# Patient Record
Sex: MALE | Race: WHITE | NOT HISPANIC OR LATINO | Employment: FULL TIME | URBAN - METROPOLITAN AREA
[De-identification: names, ages, dates, MRNs, and addresses within clinical notes are randomized per-mention and may not be internally consistent; named-entity substitution may affect disease eponyms.]

---

## 2017-01-16 ENCOUNTER — OFFICE VISIT (OUTPATIENT)
Dept: FAMILY MEDICINE | Facility: OTHER | Age: 57
End: 2017-01-16
Payer: COMMERCIAL

## 2017-01-16 ENCOUNTER — TELEPHONE (OUTPATIENT)
Dept: FAMILY MEDICINE | Facility: OTHER | Age: 57
End: 2017-01-16

## 2017-01-16 VITALS
DIASTOLIC BLOOD PRESSURE: 104 MMHG | SYSTOLIC BLOOD PRESSURE: 170 MMHG | TEMPERATURE: 97.5 F | OXYGEN SATURATION: 100 % | HEIGHT: 63 IN | WEIGHT: 176 LBS | RESPIRATION RATE: 12 BRPM | HEART RATE: 66 BPM | BODY MASS INDEX: 31.18 KG/M2

## 2017-01-16 DIAGNOSIS — R03.0 ELEVATED BLOOD PRESSURE READING WITHOUT DIAGNOSIS OF HYPERTENSION: ICD-10-CM

## 2017-01-16 DIAGNOSIS — Z11.59 NEED FOR HEPATITIS C SCREENING TEST: ICD-10-CM

## 2017-01-16 DIAGNOSIS — R73.09 ELEVATED GLUCOSE: Primary | ICD-10-CM

## 2017-01-16 DIAGNOSIS — Z13.1 SCREENING FOR DIABETES MELLITUS: ICD-10-CM

## 2017-01-16 DIAGNOSIS — J01.00 ACUTE MAXILLARY SINUSITIS, RECURRENCE NOT SPECIFIED: Primary | ICD-10-CM

## 2017-01-16 DIAGNOSIS — Z13.6 CARDIOVASCULAR SCREENING; LDL GOAL LESS THAN 160: ICD-10-CM

## 2017-01-16 LAB
ANION GAP SERPL CALCULATED.3IONS-SCNC: 5 MMOL/L (ref 3–14)
BUN SERPL-MCNC: 21 MG/DL (ref 7–30)
CALCIUM SERPL-MCNC: 9.1 MG/DL (ref 8.5–10.1)
CHLORIDE SERPL-SCNC: 105 MMOL/L (ref 94–109)
CHOLEST SERPL-MCNC: 242 MG/DL
CO2 SERPL-SCNC: 33 MMOL/L (ref 20–32)
CREAT SERPL-MCNC: 1.05 MG/DL (ref 0.66–1.25)
GFR SERPL CREATININE-BSD FRML MDRD: 73 ML/MIN/1.7M2
GLUCOSE SERPL-MCNC: 128 MG/DL (ref 70–99)
HDLC SERPL-MCNC: 44 MG/DL
LDLC SERPL CALC-MCNC: 160 MG/DL
NONHDLC SERPL-MCNC: 198 MG/DL
POTASSIUM SERPL-SCNC: 5.4 MMOL/L (ref 3.4–5.3)
SODIUM SERPL-SCNC: 143 MMOL/L (ref 133–144)
TRIGL SERPL-MCNC: 188 MG/DL

## 2017-01-16 PROCEDURE — 80048 BASIC METABOLIC PNL TOTAL CA: CPT | Performed by: PHYSICIAN ASSISTANT

## 2017-01-16 PROCEDURE — 36415 COLL VENOUS BLD VENIPUNCTURE: CPT | Performed by: PHYSICIAN ASSISTANT

## 2017-01-16 PROCEDURE — 80061 LIPID PANEL: CPT | Performed by: PHYSICIAN ASSISTANT

## 2017-01-16 PROCEDURE — 86803 HEPATITIS C AB TEST: CPT | Performed by: PHYSICIAN ASSISTANT

## 2017-01-16 PROCEDURE — 99214 OFFICE O/P EST MOD 30 MIN: CPT | Performed by: PHYSICIAN ASSISTANT

## 2017-01-16 RX ORDER — CEFDINIR 300 MG/1
300 CAPSULE ORAL 2 TIMES DAILY
Qty: 20 CAPSULE | Refills: 0 | Status: SHIPPED | OUTPATIENT
Start: 2017-01-16 | End: 2017-02-08

## 2017-01-16 RX ORDER — FLUTICASONE PROPIONATE 50 MCG
1-2 SPRAY, SUSPENSION (ML) NASAL DAILY
Qty: 1 BOTTLE | Refills: 1 | Status: SHIPPED | OUTPATIENT
Start: 2017-01-16 | End: 2018-04-03

## 2017-01-16 ASSESSMENT — PAIN SCALES - GENERAL: PAINLEVEL: NO PAIN (0)

## 2017-01-16 NOTE — MR AVS SNAPSHOT
After Visit Summary   1/16/2017    Jorge Jeffers    MRN: [id number]           Patient Information     Date Of Birth          1960        Visit Information        Provider Department      1/16/2017 8:30 AM Carlos Joseph PA-C [facility]        Today's Diagnoses     Acute maxillary sinusitis, recurrence not specified    -  1     CARDIOVASCULAR SCREENING; LDL GOAL LESS THAN 160         Screening for diabetes mellitus         Need for hepatitis C screening test         Elevated blood pressure reading without diagnosis of hypertension           Care Instructions    Will place you on a 10 day course of Omnicef to take twice daily for 10 days to treat the sinus infection.  Take a probiotic or Activia yogurt daily while on this.  Drink plenty of fluids.  Can use an over the counter Nettipot or sinus rinse to help with nasal congestion. Mucinex can also be helpful.   I also recommend Flonase to help with nasal swelling instead of the Zicam.  Follow up if symptoms are not improving.    Follow up weekly for the next 2 weeks for blood pressure checks. If still high, we may need to discuss adding a medication.  Try to eat a low salt diet.  Work on exercising 3-5 days per week.    I will notify you of your lab results.    Follow up annually.           Follow-ups after your visit        Your next 10 appointments already scheduled     Feb 08, 2017  4:15 PM   New Visit with Denis Guerrero MD   [facility]    [address] [place] [id number] [phone]              Who to contact     If you have questions or need follow up information about today's clinic visit or your schedule please contact [facility] directly at [phone].  Normal or non-critical lab and imaging results will be communicated to you by MyChart, letter or phone within 4 business days after the clinic has received the results. If you  "do not hear from us within 7 days, please contact the clinic through [facility] or phone. If you have a critical or abnormal lab result, we will notify you by phone as soon as possible.  Submit refill requests through [facility] or call your pharmacy and they will forward the refill request to us. Please allow 3 business days for your refill to be completed.          Additional Information About Your Visit        [employer] Information     [facility] lets you send messages to your doctor, view your test results, renew your prescriptions, schedule appointments and more. To sign up, go to [email or web address] . Click on \"Log in\" on the left side of the screen, which will take you to the Welcome page. Then click on \"Sign up Now\" on the right side of the page.     You will be asked to enter the access code listed below, as well as some personal information. Please follow the directions to create your username and password.     Your access code is: [email or web address]  Expires: 2017  8:53 AM     Your access code will  in 90 days. If you need help or a new code, please call your [place] clinic or [phone].        Care EveryWhere ID     This is your Care EveryWhere ID. This could be used by other organizations to access your [place] medical records  [id number]        Your Vitals Were     Pulse Temperature Respirations Height BMI (Body Mass Index) Pulse Oximetry    66 97.5  F (36.4  C) (Temporal) 12 5' 3\" (1.6 m) 31.18 kg/m2 100%       Blood Pressure from Last 3 Encounters:   17 160/108   16 104/71   16 120/72    Weight from Last 3 Encounters:   17 176 lb (79.833 kg)   16 167 lb (75.751 kg)   16 168 lb 11.2 oz (76.522 kg)              We Performed the Following     Basic metabolic panel  (Ca, Cl, CO2, Creat, Gluc, K, Na, BUN)     Hepatitis C antibody     Lipid Profile with reflex to direct LDL          Today's Medication Changes          These changes are accurate as of: 17  8:53 " AM.  If you have any questions, ask your nurse or doctor.               Start taking these medicines.        Dose/Directions    cefdinir 300 MG capsule   Commonly known as:  OMNICEF   Used for:  Acute maxillary sinusitis, recurrence not specified   Started by:  Carlos Joseph PA-C        Dose:  300 mg   Take 1 capsule (300 mg) by mouth 2 times daily   Quantity:  20 capsule   Refills:  0       fluticasone 50 MCG/ACT spray   Commonly known as:  FLONASE   Used for:  Acute maxillary sinusitis, recurrence not specified   Started by:  Carlos Joseph PA-C        Dose:  1-2 spray   Spray 1-2 sprays into both nostrils daily   Quantity:  1 Bottle   Refills:  1            Where to get your medicines      These medications were sent to [facility] [address] [place] [address] [facility] AT [facility] [address] & [facility]  [address] [facility], [facility] [address]     Phone:  [phone]    - cefdinir 300 MG capsule  - fluticasone 50 MCG/ACT spray             Primary Care Provider Office Phone # Fax #    Tami Banuelos [phone]       [facility]  [address] [facility] [address]        Thank you!     Thank you for choosing [facility]  for your care. Our goal is always to provide you with excellent care. Hearing back from our patients is one way we can continue to improve our services. Please take a few minutes to complete the written survey that you may receive in the mail after your visit with us. Thank you!             Your Updated Medication List - Protect others around you: Learn how to safely use, store and throw away your medicines at www.disposemymeds.org.          This list is accurate as of: 1/16/17  8:53 AM.  Always use your most recent med list.                   Brand Name Dispense Instructions for use    cefdinir 300 MG capsule    OMNICEF    20 capsule    Take 1 capsule (300 mg) by mouth 2 times daily       fluticasone 50 MCG/ACT spray     FLONASE    1 Bottle    Spray 1-2 sprays into both nostrils daily       scopolamine 72 hr patch    TRANSDERM-SCOP (1.5 MG)    4 patch    Place 1 patch onto the skin every 72 hours Apply to hairless area behind one ear at least 4 hours before travel.

## 2017-01-16 NOTE — PATIENT INSTRUCTIONS
Will place you on a 10 day course of Omnicef to take twice daily for 10 days to treat the sinus infection.  Take a probiotic or Activia yogurt daily while on this.  Drink plenty of fluids.  Can use an over the counter Nettipot or sinus rinse to help with nasal congestion. Mucinex can also be helpful.   I also recommend Flonase to help with nasal swelling instead of the Zicam.  Follow up if symptoms are not improving.    Follow up weekly for the next 2 weeks for blood pressure checks. If still high, we may need to discuss adding a medication.  Try to eat a low salt diet.  Work on exercising 3-5 days per week.    I will notify you of your lab results.    Follow up annually.

## 2017-01-16 NOTE — NURSING NOTE
"Chief Complaint   Patient presents with     Sinus Problem       Initial /108 mmHg  Pulse 66  Temp(Src) 97.5  F (36.4  C) (Temporal)  Resp 12  Ht 5' 3\" (1.6 m)  Wt 176 lb (79.833 kg)  BMI 31.18 kg/m2  SpO2 100% Estimated body mass index is 31.18 kg/(m^2) as calculated from the following:    Height as of this encounter: 5' 3\" (1.6 m).    Weight as of this encounter: 176 lb (79.833 kg).  BP completed using cuff size: regular    Naomi Retana CMA      "

## 2017-01-16 NOTE — Clinical Note
[facility]  [address] [facility] [address]  [facility] [id number] [phone]             January 17, 2017    Jorge Downingpalma  [address] [facility] [address]              Dear Jorge,    Please call and notify patient that his cholesterol was elevated along with elevated glucose and potassium. I would like him to undergo another lab test called an A1c which looks at his glucose over the past 3 months to make sure he does not have diabetes. His potassium is an electrolytes in his body like sodium that can be elevated in patient's who have high blood glucose or those who eat a lot of foods that are high in potassium. I recommend he cut back on the potassium rich foods in his diet which include foods such as: nuts, bran, spinach, tomatoes, cauliflower, bananas, kiwi, and beef. He should also work on cutting back on the sugary, fatty foods and try to exercise 3-5 days per week. I would like him to follow up within the next month to further discuss his lab results as well as his high blood sugar. In the meantime, he should come in to get his A1c drawn. Let me know if he has questions.     Carlos Joseph PA-C     Results for orders placed or performed in visit on 01/16/17   Lipid Profile with reflex to direct LDL   Result Value Ref Range    Cholesterol 242 (H) <200 mg/dL    Triglycerides 188 (H) <150 mg/dL    HDL Cholesterol 44 >39 mg/dL    LDL Cholesterol Calculated 160 (H) <100 mg/dL    Non HDL Cholesterol 198 (H) <130 mg/dL   Basic metabolic panel  (Ca, Cl, CO2, Creat, Gluc, K, Na, BUN)   Result Value Ref Range    Sodium 143 133 - 144 mmol/L    Potassium 5.4 (H) 3.4 - 5.3 mmol/L    Chloride 105 94 - 109 mmol/L    Carbon Dioxide 33 (H) 20 - 32 mmol/L    Anion Gap 5 3 - 14 mmol/L    Glucose 128 (H) 70 - 99 mg/dL    Urea Nitrogen 21 7 - 30 mg/dL    Creatinine 1.05 0.66 - 1.25 mg/dL    GFR Estimate 73 >60 mL/min/1.7m2    GFR Estimate If Black 89 >60 mL/min/1.7m2    Calcium 9.1 8.5 - 10.1 mg/dL

## 2017-01-16 NOTE — TELEPHONE ENCOUNTER
----- Message from Carlos Joseph PA-C sent at 1/16/2017 12:33 PM CST -----  Please call and notify patient that his cholesterol was elevated along with elevated glucose and potassium. I would like him to undergo another lab test called an A1c which looks at his glucose over the past 3 months to make sure he does not have diabetes. His potassium is an electrolytes in his body like sodium that can be elevated in patient's who have high blood glucose or those who eat a lot of foods that are high in potassium. I recommend he cut back on the potassium rich foods in his diet which include foods such as: nuts, bran, spinach, tomatoes, cauliflower, bananas, kiwi, and beef. He should also work on cutting back on the sugary, fatty foods and try to exercise 3-5 days per week. I would like him to follow up within the next month to further discuss his lab results as well as his high blood sugar. In the meantime, he should come in to get his A1c drawn. Let me know if he has questions.    Carlos Joseph PA-C

## 2017-01-16 NOTE — PROGRESS NOTES
"  SUBJECTIVE:                                                    Jorge Jeffers is a 56 year old male who presents to clinic today for the following health issues:    HPI    Acute Illness   Acute illness concerns: sinus  Onset: 4 weeks    Fever: no    Chills/Sweats: no    Headache (location?): no    Sinus Pressure:YES- facial pain and teeth pain    Conjunctivitis:  no    Ear Pain: no    Rhinorrhea: no    Congestion: YES    Sore Throat: no     Cough: YES - \"very little\"    Wheeze: no    Decreased Appetite: no    Nausea: no    Vomiting: no    Diarrhea:  no    Dysuria/Freq.: no    Fatigue/Achiness: YES- fatigue    Sick/Strep Exposure: no     Therapies Tried and outcome: Taylor     Has been experiencing sinus pressure, pain and congestion for the past month. He has been using a lot of over the counter Zicam which provides short term relief of his congestion.     Problem list and histories reviewed & adjusted, as indicated.  Additional history: none    Problem list, Medication list, Allergies, and Medical/Social/Surgical histories reviewed in [facility] and updated as appropriate.    ROS:  GENERAL: Denies fever, fatigue, weakness, weight gain, or weight loss.  HEENT: Eyes-Denies pain, redness, loss of vision, double or blurred vision.     Ears/Nose- +Sinus, congestion, nasal drainage. Denies tinnitus, loss of hearing, epistaxis, decreased sense of smell. Denies loss of sense of taste, dry mouth, or sore throat.   CARDIOVASCULAR: Denies chest pain, shortness of breath, irregular heartbeats, palpitations, or edema.  RESPIRATORY: +Very slight cough. Denies hemoptysis and shortness of breath.  GASTROINTESTINAL: Denies nausea, vomiting, change in appetite, abdominal pain, diarrhea, or constipation.  GENITOURINARY: Denies increased frequency, urgency, dysuria, hematuria, or incontinence.   NEUROLOGIC: Denies headache, fainting, dizziness, memory loss, numbness, tingling, or seizures.    OBJECTIVE:                                    " "                /104 mmHg  Pulse 66  Temp(Src) 97.5  F (36.4  C) (Temporal)  Resp 12  Ht 5' 3\" (1.6 m)  Wt 176 lb (79.833 kg)  BMI 31.18 kg/m2  SpO2 100%  Body mass index is 31.18 kg/(m^2).  GENERAL: healthy, alert and no distress  EYES: Eyes grossly normal to inspection, PERRL and conjunctivae and sclerae normal  HENT: ear canals and TM's normal. Nasal mucosa is erythematous and edematous. Pharynx is clear.   NECK: no adenopathy, no asymmetry, masses, or scars and thyroid normal to palpation  RESP: lungs clear to auscultation - no rales, rhonchi or wheezes  CV: regular rate and rhythm, normal S1 S2, no S3 or S4, no murmur, click or rub, no peripheral edema   NEURO: Normal strength and tone, mentation intact and speech normal.       ASSESSMENT/PLAN:                                                        ICD-10-CM    1. Acute maxillary sinusitis, recurrence not specified J01.00 cefdinir (OMNICEF) 300 MG capsule     fluticasone (FLONASE) 50 MCG/ACT spray   2. Elevated blood pressure reading without diagnosis of hypertension R03.0    3. CARDIOVASCULAR SCREENING; LDL GOAL LESS THAN 160 Z13.6 Lipid Profile with reflex to direct LDL   4. Screening for diabetes mellitus Z13.1 Basic metabolic panel  (Ca, Cl, CO2, Creat, Gluc, K, Na, BUN)   5. Need for hepatitis C screening test Z11.59 Hepatitis C antibody         1. Symptoms consistent with sinusitis. Will place him on a 10 day course of Omnicef to take twice daily for 10 days.  Instructed to take a probiotic or Activia yogurt daily while on this.  Drink plenty of fluids.  Can use an over the counter Nettipot or sinus rinse to help with nasal congestion. Mucinex can also be helpful.   I also reocommend Flonase to help with nasal swelling instead of the Zicam.  Follow up if symptoms are not improving.    2. Patient's blood pressure is quite elevated today during both readings although he states he drink coffee before he came. He denies any history of " hypertension.  I would like him to follow up weekly for the next 2 weeks for blood pressure checks. If still high, we may need to discuss adding a medication.  I discussed the importance of a low salt diet along with exercising 3-5 days per week.  Work on exercising 3-5 days per week.    3-5. Patient due for fasting labs so will order today and notify him of the results. If all labs stable, follow up annually.    Greater than 50% of 25 minute visit spent on counseling and plan of care.      Carlos Joseph PA-C  [facility]

## 2017-01-17 DIAGNOSIS — R73.09 ELEVATED GLUCOSE: ICD-10-CM

## 2017-01-17 DIAGNOSIS — E78.5 HYPERLIPIDEMIA LDL GOAL <130: Primary | ICD-10-CM

## 2017-01-17 LAB
HBA1C MFR BLD: 5.7 % (ref 4.3–6)
HCV AB SERPL QL IA: NORMAL

## 2017-01-17 PROCEDURE — 36415 COLL VENOUS BLD VENIPUNCTURE: CPT | Performed by: PHYSICIAN ASSISTANT

## 2017-01-17 PROCEDURE — 83036 HEMOGLOBIN GLYCOSYLATED A1C: CPT | Performed by: PHYSICIAN ASSISTANT

## 2017-01-17 RX ORDER — ATORVASTATIN CALCIUM 10 MG/1
10 TABLET, FILM COATED ORAL DAILY
Qty: 90 TABLET | Refills: 1 | Status: SHIPPED | OUTPATIENT
Start: 2017-01-17 | End: 2018-04-03

## 2017-01-17 NOTE — TELEPHONE ENCOUNTER
LOREE for pt to return our call. See message below. Vashti Lara CMA ([facility])

## 2017-01-23 ENCOUNTER — ALLIED HEALTH/NURSE VISIT (OUTPATIENT)
Dept: FAMILY MEDICINE | Facility: OTHER | Age: 57
End: 2017-01-23
Payer: COMMERCIAL

## 2017-01-23 ENCOUNTER — OFFICE VISIT (OUTPATIENT)
Dept: FAMILY MEDICINE | Facility: CLINIC | Age: 57
End: 2017-01-23
Payer: COMMERCIAL

## 2017-01-23 VITALS
DIASTOLIC BLOOD PRESSURE: 90 MMHG | HEART RATE: 80 BPM | TEMPERATURE: 98.2 F | WEIGHT: 175 LBS | BODY MASS INDEX: 31.01 KG/M2 | HEIGHT: 63 IN | RESPIRATION RATE: 12 BRPM | SYSTOLIC BLOOD PRESSURE: 140 MMHG

## 2017-01-23 VITALS — DIASTOLIC BLOOD PRESSURE: 88 MMHG | HEART RATE: 60 BPM | SYSTOLIC BLOOD PRESSURE: 138 MMHG

## 2017-01-23 DIAGNOSIS — Z01.30 BP CHECK: Primary | ICD-10-CM

## 2017-01-23 DIAGNOSIS — R39.15 URINARY URGENCY: Primary | ICD-10-CM

## 2017-01-23 LAB
ALBUMIN UR-MCNC: NEGATIVE MG/DL
APPEARANCE UR: CLEAR
BILIRUB UR QL STRIP: NEGATIVE
COLOR UR AUTO: YELLOW
GLUCOSE UR STRIP-MCNC: NEGATIVE MG/DL
HGB UR QL STRIP: NEGATIVE
KETONES UR STRIP-MCNC: NEGATIVE MG/DL
LEUKOCYTE ESTERASE UR QL STRIP: NEGATIVE
NITRATE UR QL: POSITIVE
PH UR STRIP: 6.5 PH (ref 5–7)
RBC #/AREA URNS AUTO: NORMAL /HPF (ref 0–2)
SP GR UR STRIP: <=1.005 (ref 1–1.03)
URN SPEC COLLECT METH UR: ABNORMAL
UROBILINOGEN UR STRIP-ACNC: 0.2 EU/DL (ref 0.2–1)
WBC #/AREA URNS AUTO: NORMAL /HPF (ref 0–2)

## 2017-01-23 PROCEDURE — 87086 URINE CULTURE/COLONY COUNT: CPT | Performed by: FAMILY MEDICINE

## 2017-01-23 PROCEDURE — 81001 URINALYSIS AUTO W/SCOPE: CPT | Performed by: FAMILY MEDICINE

## 2017-01-23 PROCEDURE — 99207 ZZC NO CHARGE NURSE ONLY: CPT

## 2017-01-23 PROCEDURE — 99213 OFFICE O/P EST LOW 20 MIN: CPT | Performed by: FAMILY MEDICINE

## 2017-01-23 ASSESSMENT — PAIN SCALES - GENERAL: PAINLEVEL: NO PAIN (1)

## 2017-01-23 NOTE — MR AVS SNAPSHOT
After Visit Summary   1/23/2017    Jorge Jeffers    MRN: [id number]           Patient Information     Date Of Birth          1960        Visit Information        Provider Department      1/23/2017 10:20 AM Jay Bueno MD [facility]        Today's Diagnoses     Kidney pain    -  1       Care Instructions    These are new changes to your current plan of care based on today's visit:    Medications stopped    Medications to be started None at this time   Change dose of this medication    New treatments        Follow up appointments:    1.  FOLLOW UP WITH YOUR PRIMARY CARE PROVIDER: As needed    Jay Bueno MD              Follow-ups after your visit        Your next 10 appointments already scheduled     Jan 30, 2017  2:30 PM   Nurse Only with NL ANDREW TEAM B, [facility] ([facility])    [address] [address] [phone]            Feb 08, 2017  4:15 PM   New Visit with Denis Guerrero MD   [facility] ([facility]    [address] [address] [phone]            Feb 16, 2017  3:30 PM   Office Visit with Carlos Joseph PA-C   [facility] ([facility])    [address] [place] [id number] [phone]           Bring a current list of meds and any records pertaining to this visit.  For Physicals, please bring immunization records and any forms needing to be filled out.  Please arrive 10 minutes early to complete paperwork.              Who to contact     If you have questions or need follow up information about today's clinic visit or your schedule please contact [facility] directly at [phone].  Normal or non-critical lab and imaging results will be communicated to you by MyChart, letter or phone within 4 business days after the clinic has received the results. If you do not  "hear from us within 7 days, please contact the clinic through [facility] or phone. If you have a critical or abnormal lab result, we will notify you by phone as soon as possible.  Submit refill requests through [employer] or call your pharmacy and they will forward the refill request to us. Please allow 3 business days for your refill to be completed.          Additional Information About Your Visit        [facility] Information     [facility] lets you send messages to your doctor, view your test results, renew your prescriptions, schedule appointments and more. To sign up, go to [email or web address] . Click on \"Log in\" on the left side of the screen, which will take you to the Welcome page. Then click on \"Sign up Now\" on the right side of the page.     You will be asked to enter the access code listed below, as well as some personal information. Please follow the directions to create your username and password.     Your access code is: [email or web address]  Expires: 2017  8:53 AM     Your access code will  in 90 days. If you need help or a new code, please call your [place] clinic or [phone].        Care EveryWhere ID     This is your Care EveryWhere ID. This could be used by other organizations to access your [place] medical records  [id number]        Your Vitals Were     Pulse Temperature Respirations Height BMI (Body Mass Index)       80 98.2  F (36.8  C) (Temporal) 12 5' 2.5\" (1.588 m) 31.48 kg/m2        Blood Pressure from Last 3 Encounters:   17 140/90   17 170/104   16 104/71    Weight from Last 3 Encounters:   17 175 lb (79.379 kg)   17 176 lb (79.833 kg)   16 167 lb (75.751 kg)              We Performed the Following     UA reflex to Microscopic and Culture     Urine Culture Aerobic Bacterial     Urine Microscopic        Primary Care Provider Office Phone # Fax #    Tami Banuelos [phone]       [facility]  [address] [facility] " [facility] [address]        Thank you!     Thank you for choosing [facility]  for your care. Our goal is always to provide you with excellent care. Hearing back from our patients is one way we can continue to improve our services. Please take a few minutes to complete the written survey that you may receive in the mail after your visit with us. Thank you!             Your Updated Medication List - Protect others around you: Learn how to safely use, store and throw away your medicines at www.disposemymeds.org.          This list is accurate as of: 1/23/17 10:46 AM.  Always use your most recent med list.                   Brand Name Dispense Instructions for use    atorvastatin 10 MG tablet    LIPITOR    90 tablet    Take 1 tablet (10 mg) by mouth daily       cefdinir 300 MG capsule    OMNICEF    20 capsule    Take 1 capsule (300 mg) by mouth 2 times daily       fluticasone 50 MCG/ACT spray    FLONASE    1 Bottle    Spray 1-2 sprays into both nostrils daily       scopolamine 72 hr patch    TRANSDERM-SCOP (1.5 MG)    4 patch    Place 1 patch onto the skin every 72 hours Apply to hairless area behind one ear at least 4 hours before travel.

## 2017-01-23 NOTE — PROGRESS NOTES
Jorge Jeffers is a 56 year old male who comes in today for a Blood Pressure check because of ongoing monitoring .    *Document pulse and BP  *Use new set of vitals button for multiple readings.  *Use extended vitals for orthostatic    Vitals as recorded, a large cuff was used.    Patient is not taking medication     Patient is not monitoring Blood Pressure at home.    Current complaints: none    Disposition: follow-up as indicated by MD/AP

## 2017-01-23 NOTE — PATIENT INSTRUCTIONS
These are new changes to your current plan of care based on today's visit:    Medications stopped    Medications to be started None at this time   Change dose of this medication    New treatments        Follow up appointments:    1.  FOLLOW UP WITH YOUR PRIMARY CARE PROVIDER: As needed    Jay Bueno MD

## 2017-01-23 NOTE — NURSING NOTE
"Chief Complaint   Patient presents with     Kidney Problem     Panel Management     MyChart, Flu shot, Honoring choices       Initial /100 mmHg  Pulse 80  Temp(Src) 98.2  F (36.8  C) (Temporal)  Resp 12  Ht 5' 2.5\" (1.588 m)  Wt 175 lb (79.379 kg)  BMI 31.48 kg/m2 Estimated body mass index is 31.48 kg/(m^2) as calculated from the following:    Height as of this encounter: 5' 2.5\" (1.588 m).    Weight as of this encounter: 175 lb (79.379 kg).  BP completed using cuff size: nikolay Wilburn MA    "

## 2017-01-23 NOTE — PROGRESS NOTES
"  SUBJECTIVE:                                                    Jorge Jeffers is a 56 year old male who presents to clinic today for the following health issues:      Genitourinary - Male--    For approximately the last week, this patient has had a sense of urgency to urinate but without major frequency. He states that his urine flow is normal and he feels that he empties his bladder well. Nocturia ×1 which is in chronic and long-standing. Denies any dysuria.     The symptom seems to have begun at the time of a significant sinus infection. He is currently on Omnicef with improvement. He has not had the need for decongestants or antihistamines which may have caused side effects. Symptoms seem to have been possibly magnified with the use of Omnicef.    His use of caffeine is stable with 2 cups per day. No other caffeine intake.  Today took an over-the-counter dose of Pyridium which is affected his urinalysis and created suspected false positive \"Nitrates\"        Onset: 5 days      Description:   Dysuria (painful urination): no   Hematuria (blood in urine): no   Frequency: YES-\"it feels like I have to go all the time\"   Are you urinating at night : no   Hesitancy (delay in urine): no   Retention (unable to empty): no   Decrease in urinary flow: no   Incontinence: no     Progression of Symptoms:  improving    Accompanying Signs & Symptoms:  Fever: no   Back/Flank pain: no   Urethral discharge: no   Testicle lumps/masses/pain: no   Nausea and/or vomiting: no   Abdominal pain: no    History:   History of frequent UTI's: no   History of kidney stones: no   History of hernias: no   Personal or Family history of Prostate problems: no  Sexually active: YES    Precipitating factors:       Alleviating factors:           Therapies Tried and outcome: none; Outcome - none           Problem list and histories reviewed & adjusted, as indicated.  Additional history: as documented    Problem list, Medication list, Allergies, and " "Medical/Social/Surgical histories reviewed in [facility] and updated as appropriate.    ROS:  C: NEGATIVE for fever, chills, change in weight  E: NEGATIVE for vision changes or irritation  E/M: NEGATIVE for ear, mouth and throat problems except for the improving sinusitis  R: NEGATIVE for significant cough or SOB  CV: NEGATIVE for chest pain, palpitations or peripheral edema  CV: over the last few weeks, has noted some elevated blood pressure readings. Blood pressure apparently has been at 140/90 at work.  GI: NEGATIVE for nausea, abdominal pain, heartburn, or change in bowel habits   male : As noted above  M: NEGATIVE for significant arthralgias or myalgia  N: NEGATIVE for weakness, dizziness or paresthesias  E: NEGATIVE for temperature intolerance, skin/hair changes  H: NEGATIVE for bleeding problems  P: NEGATIVE for changes in mood or affect    OBJECTIVE:                                                    /90 mmHg  Pulse 80  Temp(Src) 98.2  F (36.8  C) (Temporal)  Resp 12  Ht 5' 2.5\" (1.588 m)  Wt 175 lb (79.379 kg)  BMI 31.48 kg/m2  Body mass index is 31.48 kg/(m^2).  GENERAL: healthy, alert and no distress  HENT: ear canals- normal; TMs- normal; Nose- normal; Mouth- no ulcers, no lesions  NECK: no tenderness, no adenopathy, no asymmetry, no masses, no stiffness; thyroid- normal to palpation  RESP: lungs clear to auscultation - no rales, no rhonchi, no wheezes  CV: regular rates and rhythm, normal S1 S2, no S3 or S4 and no murmur, no click or rub -  ABDOMEN: soft, no tenderness, no  hepatosplenomegaly, no masses, normal bowel sounds  MS: extremities- no gross deformities noted, no edema  NEURO: strength and tone- normal, sensory exam- grossly normal, mentation- intact, speech- normal, reflexes- symmetric  BACK: no CVA tenderness, no paralumbar tenderness  - male: testicles- normal, no atrophy, no masses;  no inguinal hernias  Rectal- male: prostate symmetric w/o nodularity, no masses palpated, prostate " 2-3+ and reports no tenderness with examination of the prostate. No nodules noted.  PSYCH: Alert and oriented times 3; speech- coherent , normal rate and volume; able to articulate logical thoughts, able to abstract reason, no tangential thoughts, no hallucinations or delusions, affect- normal    Diagnostic test results:  Diagnostic Test Results:  Results for orders placed or performed in visit on 01/23/17 (from the past 24 hour(s))   UA reflex to Microscopic and Culture   Result Value Ref Range    Color Urine Yellow     Appearance Urine Clear     Glucose Urine Negative NEG mg/dL    Bilirubin Urine Negative NEG    Ketones Urine Negative NEG mg/dL    Specific Gravity Urine <=1.005 1.003 - 1.035    Blood Urine Negative NEG    pH Urine 6.5 5.0 - 7.0 pH    Protein Albumin Urine Negative NEG mg/dL    Urobilinogen Urine 0.2 0.2 - 1.0 EU/dL    Nitrite Urine Positive (A) NEG    Leukocyte Esterase Urine Negative NEG    Source Midstream Urine    Urine Microscopic   Result Value Ref Range    WBC Urine O - 2 0 - 2 /HPF    RBC Urine O - 2 0 - 2 /HPF        Patient did take medications (generic Pyridium) a few hours prior to the urinalysis being obtained.  ASSESSMENT/PLAN:                                                      (R39.15) Urinary urgency  (primary encounter diagnosis)  Comment:  Minor symptoms at the present time, the acute onset associated chronologically with his respiratory infection. The symptoms are improving. No sign of prostatitis on clinical exam. Suspect the urinalysis is negative and a urine culture will be negative. The present time, have recommended monitoring the symptoms. Currently no medications were given.   Plan: UA reflex to Microscopic and Culture, Urine         Culture Aerobic Bacterial, Urine Microscopic            (I10) Elevated BP  Suggested low-salt diet. To continue monitoring his blood pressure is currently recommended on a weekly basis. He is being [facility] at [facility].   Plan:          Follow up with Provider - follow-up as recommended for his blood pressure.   See Patient Instructions    The patient understood the rational for the diagnosis and treatment plan. All questions were answered to best of my ability and the patient's satisfaction.      Jay Bueno MD  [facility]

## 2017-01-27 ENCOUNTER — TELEPHONE (OUTPATIENT)
Dept: FAMILY MEDICINE | Facility: CLINIC | Age: 57
End: 2017-01-27

## 2017-01-27 LAB
BACTERIA SPEC CULT: NO GROWTH
MICRO REPORT STATUS: NORMAL
SPECIMEN SOURCE: NORMAL

## 2017-01-27 NOTE — TELEPHONE ENCOUNTER
Left message asking patient to return call.  Please inform patient of RESULTS from Provider below.         Please call pt- his urine culture was negative for infection. Tootie Zuleta PA-C

## 2017-01-28 ENCOUNTER — TELEPHONE (OUTPATIENT)
Dept: NURSING | Facility: CLINIC | Age: 57
End: 2017-01-28

## 2017-01-28 NOTE — TELEPHONE ENCOUNTER
Call Type: Triage Call    Presenting Problem:     Patient returning clinic call from yesterday.  Patient was read the following   Call Documentation: 01/27/17 per  Tootie Zuleta PA-C per [facility]:  Please inform patient of RESULTS  from Provider below.         Please call pt- his urine culture was  negative for infection. Tootie Zuleta PA-C  Triage Note:  Guideline Title: Information Only Call; No Symptom Triage (Adult)  Recommended Disposition: Provide Information or Advice Only  Original Inclination:  Override Disposition:  Intended Action:  Physician Contacted: No  Follow-up call to recent contact; no triage required. Information provided from  past call documentation, approved references or experience. ?  YES  Requesting regular office appointment ? NO  Sign(s) or symptom(s) associated with a diagnosed condition or with a new illness  ? NO  Requesting information about provider, services or community resources ? NO  Call back to complete assessment/clarification of information from prior caller to  complete triage ? NO  Requesting information and provider is best resource; no triage required. ? NO  Caller not with patient and is unable to provide clinical information about  patient to facilitate triage. ? NO  Requesting provider information for recently scheduled test, procedure; no triage  required. Needed information not available per approved resources or clinical  experience. ? NO  Requesting information not available per approved reference or clinical  experience; no triage required. ? NO  Requesting information regarding scheduled exam, test or procedure; no triage  required. Information provided from approved resources or clinical experience. ?  NO  General information question; no triage required. Information provided from  approved references or knowledge of organization. ? NO  Health information question; person denies any symptoms, no triage required.  Information provided from approved  references or clinical experience. ? NO  Physician Instructions:  Care Advice:

## 2017-01-30 ENCOUNTER — ALLIED HEALTH/NURSE VISIT (OUTPATIENT)
Dept: FAMILY MEDICINE | Facility: OTHER | Age: 57
End: 2017-01-30
Payer: COMMERCIAL

## 2017-01-30 VITALS — SYSTOLIC BLOOD PRESSURE: 132 MMHG | HEART RATE: 60 BPM | DIASTOLIC BLOOD PRESSURE: 70 MMHG

## 2017-01-30 DIAGNOSIS — Z01.30 BP CHECK: Primary | ICD-10-CM

## 2017-01-30 PROCEDURE — 99207 ZZC NO CHARGE NURSE ONLY: CPT

## 2017-01-30 NOTE — NURSING NOTE
Jorge Jeffers is a 56 year old male who comes in today for a Blood Pressure check because of ongoing blood pressure monitoring.    *Document pulse and BP  *Use new set of vitals button for multiple readings.  *Use extended vitals for orthostatic    Vitals as recorded, a regular cuff was used.    Patient is not taking medication.  Patient is not monitoring Blood Pressure at home.  Average readings if yes are NA    Current complaints: none    Disposition: follow-up as indicated by MD/AP. Has an appointment to follow up with [facility].

## 2017-02-08 ENCOUNTER — OFFICE VISIT (OUTPATIENT)
Dept: OTOLARYNGOLOGY | Facility: OTHER | Age: 57
End: 2017-02-08
Payer: COMMERCIAL

## 2017-02-08 VITALS — WEIGHT: 173 LBS | OXYGEN SATURATION: 98 % | BODY MASS INDEX: 31.12 KG/M2 | HEART RATE: 60 BPM

## 2017-02-08 DIAGNOSIS — J32.9 CHRONIC SINUSITIS, UNSPECIFIED LOCATION: Primary | ICD-10-CM

## 2017-02-08 DIAGNOSIS — N32.81 OVERACTIVE BLADDER: ICD-10-CM

## 2017-02-08 PROCEDURE — 99203 OFFICE O/P NEW LOW 30 MIN: CPT | Mod: 25 | Performed by: OTOLARYNGOLOGY

## 2017-02-08 PROCEDURE — 31231 NASAL ENDOSCOPY DX: CPT | Performed by: OTOLARYNGOLOGY

## 2017-02-08 NOTE — NURSING NOTE
"Chief Complaint   Patient presents with     Consult     Sinus Problem       Initial Pulse 60  Wt 78.472 kg (173 lb)  SpO2 98% Estimated body mass index is 31.12 kg/(m^2) as calculated from the following:    Height as of 1/23/17: 1.588 m (5' 2.5\").    Weight as of this encounter: 78.472 kg (173 lb).  Medication Reconciliation: complete  "

## 2017-02-08 NOTE — PROGRESS NOTES
History of Present Illness - Jorge Jeffers is a 56 year old male who presents with concerns about sinus symptoms. The main symptom recently has been sevre sinus pressure and congestion , and this has been present since early winter . Other associated symptoms have included scant postnasal d/c without any smell or taste or sore throat. These symptoms have been constant and are very disruptive to the patient's lifestyle. Recent treatments have included Omnicef for 2 weeks and Flonase.  A 2 week trial of nasal steroids has been completed. A burst of oral steroids has not been completed. The patient has no history of sinus surgery. The patient reports has no history for migraine headaches.  This has been going on for the last few byrd. He denies allergies. Also wakes up with frontal am headaches.  His symptoms are pretty much under control now.    Past Medical History -   Patient Active Problem List   Diagnosis     Esophageal reflux     CARDIOVASCULAR SCREENING; LDL GOAL LESS THAN 160     Motion sickness, initial encounter     Hyperlipidemia LDL goal <130     Elevated BP       Current Medications -   Current outpatient prescriptions:      atorvastatin (LIPITOR) 10 MG tablet, Take 1 tablet (10 mg) by mouth daily, Disp: 90 tablet, Rfl: 1     fluticasone (FLONASE) 50 MCG/ACT spray, Spray 1-2 sprays into both nostrils daily, Disp: 1 Bottle, Rfl: 1     scopolamine (TRANSDERM-SCOP) (1.5mg base/3day) patch, Place 1 patch onto the skin every 72 hours Apply to hairless area behind one ear at least 4 hours before travel., Disp: 4 patch, Rfl: 11    Allergies -   Allergies   Allergen Reactions     No Known Drug Allergies        Social History -   Social History     Social History     Marital Status: Unknown     Spouse Name: N/A     Number of Children: N/A     Years of Education: N/A     Social History Main Topics     Smoking status: Former Smoker     Types: Cigars     Smokeless tobacco: Never Used      Comment:  occasioanlly     Alcohol Use: Yes      Comment: 1 day a week     Drug Use: No     Sexual Activity:     Partners: Female     Birth Control/ Protection: Surgical      Comment: tubal ligation     Other Topics Concern     Parent/Sibling W/ Cabg, Mi Or Angioplasty Before 65f 55m? No     Social History Narrative       Family History -   Family History   Problem Relation Age of Onset     HEART DISEASE Father      stent     Hypertension Father      Cardiovascular Father      stent     CEREBROVASCULAR DISEASE Paternal Uncle      HEART DISEASE Paternal Grandmother      Cardiovascular Paternal Grandmother      Asthma No family hx of      C.A.D. No family hx of      DIABETES No family hx of      Breast Cancer No family hx of      Cancer - colorectal No family hx of      Prostate Cancer No family hx of      Alzheimer Disease No family hx of      Arthritis No family hx of      Blood Disease No family hx of      CANCER No family hx of      Circulatory No family hx of      Eye Disorder No family hx of      GASTROINTESTINAL DISEASE No family hx of      Genitourinary Problems No family hx of      Lipids No family hx of      Musculoskeletal Disorder No family hx of      Neurologic Disorder No family hx of      Respiratory No family hx of      Thyroid Disease No family hx of      Other Cancer No family hx of      Depression No family hx of      MENTAL ILLNESS No family hx of      Substance Abuse No family hx of      Anesthesia Reaction No family hx of      Anxiety Disorder No family hx of      OSTEOPOROSIS No family hx of      Genetic Disorder No family hx of      Colon Cancer No family hx of      Hyperlipidemia No family hx of      Coronary Artery Disease No family hx of        Review of Systems - As per HPI and PMHx, otherwise system review of the head and neck negative.    Physical Exam  Vitals: Pulse 60  Wt 78.472 kg (173 lb)  SpO2 98%  BMI= Body mass index is 31.12 kg/(m^2).    General - The patient is well nourished and well  developed, and appears to have good nutritional status.  Alert and oriented to person and place, answers questions and cooperates with examination appropriately.   Head and Face - Normocephalic and atraumatic, with no gross asymmetry noted of the contour of the facial features.  The facial nerve is intact, with strong symmetric movements.  Voice and Breathing - The patient was breathing comfortably without the use of accessory muscles. There was no wheezing, stridor, or stertor.  The patients voice was clear and strong, and had appropriate pitch and quality.  Ears - Bilateral pinna and EACs with normal appearing overlying skin. Tympanic membrane intact with good mobility on pneumatic otoscopy bilaterally. Bony landmarks of the ossicular chain are normal. The tympanic membranes are normal in appearance. No retraction, perforation, or masses.  No fluid or purulence was seen in the external canal or the middle ear.   Eyes - Extraocular movements intact.  Sclera were not icteric or injected, conjunctiva were pink and moist.  Mouth - Examination of the oral cavity showed pink, healthy oral mucosa. No lesions or ulcerations noted.  The tongue was mobile and midline, and the dentition were in good condition.    Throat - The walls of the oropharynx were smooth, pink, moist, symmetric, and had no lesions or ulcerations.  The tonsillar pillars and soft palate were symmetric.  The uvula was midline on elevation.  Neck - Normal midline excursion of the laryngotracheal complex during swallowing.  Full range of motion on passive movement.  Palpation of the occipital, submental, submandibular, internal jugular chain, and supraclavicular nodes did not demonstrate any abnormal lymph nodes or masses.  The carotid pulse was palpable bilaterally.  Palpation of the thyroid was soft and smooth, with no nodules or goiter appreciated.  The trachea was mobile and midline.  Nose - External contour is symmetric, no gross deflection or scars.   Nasal mucosa is pink and moist with no abnormal mucus.  The septum was midline and non-obstructive, turbinates of normal size and position.  No polyps, masses, or purulence noted on examination.  Neuro - normal gait and muscle tone, nonfocal  To further evaluate the nasal cavity, I performed rigid nasal endoscopy.  I first sprayed the nasal cavity bilaterally with a mix of lidocaine and neosynephrine.  I then began on the left side using a 2.7mm, 30 degree rigid nasal endoscope.  The septum was fairly straight and the nasal airway was open.  No abnormal secretions, purulence, or polyps were noted. The left middle turbinate and middle meatus were clearly visualized and normal in appearance.  Looking up, the olfactory cleft was unobstructed.  Going further back, the sphenoethmoid recess was normal in appearance, with healthy appearing mucosa on the face of the sphenoid.  The nasopharynx was unremarkable, and the eustachian tube opening on this side was unobstructed.    I then turned my attention to the right side.  Once again, the septum was fairly straight, and the airway was open.  No abnormal secretions, purulence, polyps were noted.  The right middle turbinate and middle meatus were clearly visualized and normal in appearance.  Looking up, the olfactory cleft was unobstructed.  Going further back the right sphenoethmoid recess was normal in appearance, and eustachian tube opening was unobstructed.      ASSESSMENT/PLAN:  Jorge Jeffers is a 56 year old male with recurrent winter sinusitis .  WOuld require further investigation with CT to examine anatomy of sinuses as well as allergy eval to make sure that this is not cause of the problem.  He is complaining of urinary issues for which we shall refer him to Urology.        Return in 6 weeks    Denis Guerrero MD

## 2017-02-09 ENCOUNTER — OFFICE VISIT (OUTPATIENT)
Dept: FAMILY MEDICINE | Facility: OTHER | Age: 57
End: 2017-02-09
Payer: COMMERCIAL

## 2017-02-09 VITALS
WEIGHT: 174 LBS | HEART RATE: 70 BPM | OXYGEN SATURATION: 98 % | SYSTOLIC BLOOD PRESSURE: 134 MMHG | BODY MASS INDEX: 31.3 KG/M2 | RESPIRATION RATE: 12 BRPM | DIASTOLIC BLOOD PRESSURE: 86 MMHG | TEMPERATURE: 98.7 F

## 2017-02-09 DIAGNOSIS — R30.0 DYSURIA: ICD-10-CM

## 2017-02-09 DIAGNOSIS — Z12.5 SCREENING FOR PROSTATE CANCER: ICD-10-CM

## 2017-02-09 DIAGNOSIS — R35.0 URINARY FREQUENCY: ICD-10-CM

## 2017-02-09 DIAGNOSIS — N40.1 BENIGN NON-NODULAR PROSTATIC HYPERPLASIA WITH LOWER URINARY TRACT SYMPTOMS: Primary | ICD-10-CM

## 2017-02-09 LAB
ALBUMIN UR-MCNC: NEGATIVE MG/DL
APPEARANCE UR: CLEAR
BILIRUB UR QL STRIP: NEGATIVE
COLOR UR AUTO: YELLOW
GLUCOSE UR STRIP-MCNC: NEGATIVE MG/DL
HGB UR QL STRIP: NEGATIVE
KETONES UR STRIP-MCNC: NEGATIVE MG/DL
LEUKOCYTE ESTERASE UR QL STRIP: NEGATIVE
NITRATE UR QL: NEGATIVE
PH UR STRIP: 7.5 PH (ref 5–7)
SP GR UR STRIP: 1.01 (ref 1–1.03)
URN SPEC COLLECT METH UR: ABNORMAL
UROBILINOGEN UR STRIP-ACNC: 0.2 EU/DL (ref 0.2–1)

## 2017-02-09 PROCEDURE — 99214 OFFICE O/P EST MOD 30 MIN: CPT | Performed by: PHYSICIAN ASSISTANT

## 2017-02-09 PROCEDURE — 81003 URINALYSIS AUTO W/O SCOPE: CPT | Performed by: PHYSICIAN ASSISTANT

## 2017-02-09 PROCEDURE — 87086 URINE CULTURE/COLONY COUNT: CPT | Performed by: PHYSICIAN ASSISTANT

## 2017-02-09 PROCEDURE — G0103 PSA SCREENING: HCPCS | Performed by: PHYSICIAN ASSISTANT

## 2017-02-09 RX ORDER — TERAZOSIN 2 MG/1
2 CAPSULE ORAL AT BEDTIME
Qty: 30 CAPSULE | Refills: 1 | Status: SHIPPED | OUTPATIENT
Start: 2017-02-09 | End: 2017-03-05

## 2017-02-09 ASSESSMENT — PAIN SCALES - GENERAL: PAINLEVEL: NO PAIN (0)

## 2017-02-09 NOTE — PROGRESS NOTES
Quick Note:    Test results discussed with patient during office visit.     Tish Phipps PA-C  ______

## 2017-02-09 NOTE — NURSING NOTE
"Chief Complaint   Patient presents with     UTI       Initial /86 mmHg  Pulse 70  Temp(Src) 98.7  F (37.1  C) (Oral)  Resp 12  Ht   Wt 174 lb (78.926 kg)  SpO2 98% Estimated body mass index is 31.3 kg/(m^2) as calculated from the following:    Height as of 1/23/17: 5' 2.5\" (1.588 m).    Weight as of this encounter: 174 lb (78.926 kg).  Medication Reconciliation: complete  "

## 2017-02-09 NOTE — PATIENT INSTRUCTIONS
Benign Prostatic Hyperplasia  The prostate is a small gland that makes semen. As you age, the prostate grows. If it becomes too big, it may cause problems with urination. This condition is called benign prostatic hyperplasia (BPH).  Symptoms of BPH  BPH is common in men over age 60. That s because the prostate grows bigger during a man s life. As it grows, it presses against the urethra. The urethra carries urine out of your body from your bladder through your penis. Your bladder may also weaken as you age. It may not empty completely after you urinate.  Men with BPH may have these symptoms:    The urge to frequently urinate, especially at night    Leaking or dribbling of urine    A weak stream of urine    Not able to urinate, or having trouble starting to urinate  Diagnosing BPH  BPH can hurt your bladder and kidneys. It can also lead to bladder stones and urinary tract infections. If you think you may have BPH, talk with your healthcare provider. Early treatment can prevent problems.  Several tests can diagnose BPH. These include:    Digital rectal exam. During this procedure, your provider puts a gloved, greased (lubricated) finger into your rectum to check the size of your prostate.    Imaging tests. X-rays and other imaging tests can find problems in your kidneys or bladder.    Cystoscopy. This test uses a flexible tube with a camera (scope) to look inside your urinary tract.    Urine flow study. This test uses a special device to see how fast urine leaves your body.  Treating BPH  If you have mild symptoms, you may not need treatment. You may be able to control your BPH with lifestyle changes. Some men feel better if they limit or avoid alcohol and caffeinated drinks like coffee. Not drinking too many fluids at night can also help. Increasing your physical activity may ease symptoms, too.  Kegel exercises may also help. They strengthen the pelvic muscle to prevent urine from leaking. While urinating,  contract your pelvic muscle to stop or slow down the flow of urine. Hold for 10 seconds. Repeat at least 5 times. Do the exercise 3 to 5 times each day.  Certain medicines can worsen BPH symptoms. These include medicines for congestion, allergies, and depression. Medicines that increase your urine flow (diuretics) can also worsen BPH symptoms. If you take any of these, talk with your provider. You may need to take another medicine or change how much you take.  BPH symptoms often get worse as the prostate grows. So at some point you may need treatment. Your provider may prescribe medicine to shrink the prostate or stop its growth. Other treatments can make the urethra wider to let urine to flow more easily. There are also some minimally invasive techniques to remove prostate tissue.  If your BPH is severe, your health care provider may recommend surgery. Surgery takes out enlarged parts of the prostate gland. Your health care provider can figure out the best option for you based on your age, overall health, and other factors.  BPH and Prostate Cancer  BPH and prostate cancer share some symptoms. That s why it s important to talk with your provider about your symptoms. Men with BPH aren t more likely to develop this cancer. But they may have higher levels of the prostate-specific antigen (PSA). A higher PSA level may also be a sign of prostate cancer. Certain tests help distinguish BPH from prostate cancer. They include prostate ultrasound and biopsy.    8844-7870 [employer]. [address] [place] [address] [id number]. All rights reserved. This information is not intended as a substitute for professional medical care. Always follow your healthcare professional's instructions.        BPH (Enlarged Prostate)  The prostate is a gland at the base of the bladder. As some men get older, the prostate may get bigger in size. This problem is called benign prostatic hyperplasia (BPH). BPH puts pressure on the  urethra. This is the tube that carries urine from the bladder to the penis. It may interfere with the flow of urine. It may also keep the bladder from emptying fully.    Symptoms of BPH include trouble starting urination and feeling as though the bladder isn t emptying all the way. It also includes a weak urine stream, dribbling and leaking of urine, and frequent and urgent urination (especially at night). BPH can increase the risk of urinary infections. It can also block off urine flow completely. If this occurs, a thin tube (catheter) may be passed into the bladder to help drain urine.  If symptoms are mild, no treatment may be needed right now. If symptoms are more severe, treatment is likely needed. The goal of treatment is to improve urine flow and reduce symptoms. Treatments can include medications and procedures. Your doctor will discuss treatment options with you as needed.  Home care  The following guidelines will help you care for yourself at home:    Urinate as soon as you feel the urge. Don't try to hold your urine.    Don't limit your fluid intake during the day. Drink 6 to 8 glasses of water or liquids a day. This prevents bacteria from building up in the bladder.    Avoid drinking fluids after dinner to help reduce urination during the night.    Avoid medications that can worsen your symptoms. These include certain cold and allergy medications and antidepressants. Diuretics used for high blood pressure can also worsen symptoms. Talk to your doctor about the medications you take. Other drugs may work better for you.  Prostate cancer screening  BPH does not increase the risk of prostate cancer. But because prostate cancer is a common cancer in men, screening is sometimes recommended. This may help detect the cancer in its early stages when treatment is most effective. Factors that can increase the risk of prostate cancer include being -American or having a father or brother who had prostate  cancer. A high-fat diet may also increase the risk of prostate cancer. Talk to your doctor to see whether you should be screened for prostate cancer.  Follow-up care  Follow up with your doctor or urologist as told. To learn more, go to:    National Kidney & Urologic Diseases Information Clearinghouse  kidney.niddk.nih.gov, 581.529.1121  When to seek medical care  Get prompt medical attention if any of the following occur:    Fever of 100.4 F (38.0 C) or higher, or as directed by your health care provider    Unable to pass urine for 8 hours    Increasing pressure or pain in your bladder (lower abdomen)    Blood in the urine    Increasing low back pain, not related to injury    Symptoms of urinary infection (increased urge to urinate, burning when passing urine, foul-smelling urine)    3716-0844 [employer]. [address] [place] [address] [id number]. All rights reserved. This information is not intended as a substitute for professional medical care. Always follow your healthcare professional's instructions.

## 2017-02-09 NOTE — PROGRESS NOTES
"  SUBJECTIVE:                                                    Jorge Jeffers is a 56 year old male who presents to clinic today for the following health issues:    HPI   Genitourinary - Male     Onset: 3 weeks     Description:   Dysuria (painful urination): YES  Hematuria (blood in urine): no   Frequency: YES  Are you urinating at night : YES- sometimes once a night  Hesitancy (delay in urine): no   Retention (unable to empty): no   Decrease in urinary flow: no   Incontinence: no     Progression of Symptoms:  Today is the best day    Accompanying Signs & Symptoms:  Fever: no   Back/Flank pain: no   Urethral discharge: no   Testicle lumps/masses/pain: no   Nausea and/or vomiting: no   Abdominal pain: YES   History:   History of frequent UTI's: no   History of kidney stones: no   History of hernias: no   Personal or Family history of Prostate problems: no  Sexually active: YES    Precipitating factors:       Alleviating factors:      - Frequency all the time   - Had a rectal exam by another doctor and was told \"normal\"      Therapies Tried and outcome: AZO; Outcome - seems to help a little bit      Problem list and histories reviewed & adjusted, as indicated.  Additional history: as documented    Labs reviewed in EPIC  Problem list, Medication list, Allergies, and Medical/Social/Surgical histories reviewed in [facility] and updated as appropriate.    ROS:  Constitutional, HEENT, cardiovascular, pulmonary, gi and gu systems are negative, except as otherwise noted.    OBJECTIVE:                                                    /86 mmHg  Pulse 70  Temp(Src) 98.7  F (37.1  C) (Oral)  Resp 12  Ht   Wt 174 lb (78.926 kg)  SpO2 98%  Body mass index is 31.3 kg/(m^2).  GENERAL APPEARANCE: healthy, alert and no distress  EYES: Eyes grossly normal to inspection, PERRLA, conjunctivae and sclerae without injection or discharge, EOM intact   MS: No musculoskeletal defects are noted and gait is age appropriate without " ataxia   SKIN: No suspicious lesions or rashes, hydration status appears adeuqate with normal skin turgor   Rectal exam: Declined by patient.  PSYCH: Alert and oriented x3; speech- coherent , normal rate and volume; able to articulate logical thoughts, able to abstract reason, no tangential thoughts, no hallucinations or delusions, mentation appears normal, Mood is euthymic. Affect is appropriate for this mood state and bright. Thought content is free of suicidal ideation, hallucinations, and delusions. Dress is adequate and upkept. Eye contact is good during conversation.       Diagnostic Test Results:  Results for orders placed or performed in visit on 02/09/17 (from the past 24 hour(s))   *UA reflex to Microscopic and Culture ([facility] and [facility] (except [place] and [place])   Result Value Ref Range    Color Urine Yellow     Appearance Urine Clear     Glucose Urine Negative NEG mg/dL    Bilirubin Urine Negative NEG    Ketones Urine Negative NEG mg/dL    Specific Gravity Urine 1.015 1.003 - 1.035    Blood Urine Negative NEG    pH Urine 7.5 (H) 5.0 - 7.0 pH    Protein Albumin Urine Negative NEG mg/dL    Urobilinogen Urine 0.2 0.2 - 1.0 EU/dL    Nitrite Urine Negative NEG    Leukocyte Esterase Urine Negative NEG    Source Unspecified Urine    `     ASSESSMENT/PLAN:                                                        ICD-10-CM    1. Benign non-nodular prostatic hyperplasia with lower urinary tract symptoms N40.1 terazosin (HYTRIN) 2 MG capsule   2. Urinary frequency R35.0    3. Dysuria R30.0 *UA reflex to Microscopic and Culture ([facility] and [facility] (except [place] and [place])     Urine Culture Aerobic Bacterial   4. Screening for prostate cancer Z12.5 PSA, screen     - Patient with severe urinary frequency but two normal UAs, PSA was over a year ago and was normal   - Review of chart when patient states had prostate exam (declined today) did report 2-3+ hypertrophic  prostate   - Will get culture of urine as well as update PSA today  - Results will be communicated to patient when available and appropriate medication changes made if necessary   - Recommend we treat this as BPH and start medication to help with symptoms   - Start Terazosin at night, reviewed use and side effects  - Patient has recheck with PCP next Thursday, can recheck this medication at that time as well  - If symptoms persist, could consider dose increase or referral to urology     The patient indicates understanding of these issues and agrees with the plan.    Follow up: as above         Tish Phipps PA-C  [facility]

## 2017-02-09 NOTE — MR AVS SNAPSHOT
After Visit Summary   2/9/2017    Jorge Jeffers    MRN: [id number]           Patient Information     Date Of Birth          1960        Visit Information        Provider Department      2/9/2017 3:00 PM Tish Phipps PA-C [facility]        Today's Diagnoses     Benign non-nodular prostatic hyperplasia with lower urinary tract symptoms    -  1     Urinary frequency         Dysuria         Screening for prostate cancer           Care Instructions      Benign Prostatic Hyperplasia  The prostate is a small gland that makes semen. As you age, the prostate grows. If it becomes too big, it may cause problems with urination. This condition is called benign prostatic hyperplasia (BPH).  Symptoms of BPH  BPH is common in men over age 60. That s because the prostate grows bigger during a man s life. As it grows, it presses against the urethra. The urethra carries urine out of your body from your bladder through your penis. Your bladder may also weaken as you age. It may not empty completely after you urinate.  Men with BPH may have these symptoms:    The urge to frequently urinate, especially at night    Leaking or dribbling of urine    A weak stream of urine    Not able to urinate, or having trouble starting to urinate  Diagnosing BPH  BPH can hurt your bladder and kidneys. It can also lead to bladder stones and urinary tract infections. If you think you may have BPH, talk with your healthcare provider. Early treatment can prevent problems.  Several tests can diagnose BPH. These include:    Digital rectal exam. During this procedure, your provider puts a gloved, greased (lubricated) finger into your rectum to check the size of your prostate.    Imaging tests. X-rays and other imaging tests can find problems in your kidneys or bladder.    Cystoscopy. This test uses a flexible tube with a camera (scope) to look inside your urinary tract.    Urine flow study. This test uses  a special device to see how fast urine leaves your body.  Treating BPH  If you have mild symptoms, you may not need treatment. You may be able to control your BPH with lifestyle changes. Some men feel better if they limit or avoid alcohol and caffeinated drinks like coffee. Not drinking too many fluids at night can also help. Increasing your physical activity may ease symptoms, too.  Kegel exercises may also help. They strengthen the pelvic muscle to prevent urine from leaking. While urinating, contract your pelvic muscle to stop or slow down the flow of urine. Hold for 10 seconds. Repeat at least 5 times. Do the exercise 3 to 5 times each day.  Certain medicines can worsen BPH symptoms. These include medicines for congestion, allergies, and depression. Medicines that increase your urine flow (diuretics) can also worsen BPH symptoms. If you take any of these, talk with your provider. You may need to take another medicine or change how much you take.  BPH symptoms often get worse as the prostate grows. So at some point you may need treatment. Your provider may prescribe medicine to shrink the prostate or stop its growth. Other treatments can make the urethra wider to let urine to flow more easily. There are also some minimally invasive techniques to remove prostate tissue.  If your BPH is severe, your health care provider may recommend surgery. Surgery takes out enlarged parts of the prostate gland. Your health care provider can figure out the best option for you based on your age, overall health, and other factors.  BPH and Prostate Cancer  BPH and prostate cancer share some symptoms. That s why it s important to talk with your provider about your symptoms. Men with BPH aren t more likely to develop this cancer. But they may have higher levels of the prostate-specific antigen (PSA). A higher PSA level may also be a sign of prostate cancer. Certain tests help distinguish BPH from prostate cancer. They include  prostate ultrasound and biopsy.    9527-0563 [employer]. [address] [place] [address], [place] [address]. All rights reserved. This information is not intended as a substitute for professional medical care. Always follow your healthcare professional's instructions.        BPH (Enlarged Prostate)  The prostate is a gland at the base of the bladder. As some men get older, the prostate may get bigger in size. This problem is called benign prostatic hyperplasia (BPH). BPH puts pressure on the urethra. This is the tube that carries urine from the bladder to the penis. It may interfere with the flow of urine. It may also keep the bladder from emptying fully.    Symptoms of BPH include trouble starting urination and feeling as though the bladder isn t emptying all the way. It also includes a weak urine stream, dribbling and leaking of urine, and frequent and urgent urination (especially at night). BPH can increase the risk of urinary infections. It can also block off urine flow completely. If this occurs, a thin tube (catheter) may be passed into the bladder to help drain urine.  If symptoms are mild, no treatment may be needed right now. If symptoms are more severe, treatment is likely needed. The goal of treatment is to improve urine flow and reduce symptoms. Treatments can include medications and procedures. Your doctor will discuss treatment options with you as needed.  Home care  The following guidelines will help you care for yourself at home:    Urinate as soon as you feel the urge. Don't try to hold your urine.    Don't limit your fluid intake during the day. Drink 6 to 8 glasses of water or liquids a day. This prevents bacteria from building up in the bladder.    Avoid drinking fluids after dinner to help reduce urination during the night.    Avoid medications that can worsen your symptoms. These include certain cold and allergy medications and antidepressants. Diuretics used for high blood pressure can  also worsen symptoms. Talk to your doctor about the medications you take. Other drugs may work better for you.  Prostate cancer screening  BPH does not increase the risk of prostate cancer. But because prostate cancer is a common cancer in men, screening is sometimes recommended. This may help detect the cancer in its early stages when treatment is most effective. Factors that can increase the risk of prostate cancer include being -American or having a father or brother who had prostate cancer. A high-fat diet may also increase the risk of prostate cancer. Talk to your doctor to see whether you should be screened for prostate cancer.  Follow-up care  Follow up with your doctor or urologist as told. To learn more, go to:    National Kidney & Urologic Diseases Information Clearinghouse  kidney.niddk.nih.gov, 976.726.8081  When to seek medical care  Get prompt medical attention if any of the following occur:    Fever of 100.4 F (38.0 C) or higher, or as directed by your health care provider    Unable to pass urine for 8 hours    Increasing pressure or pain in your bladder (lower abdomen)    Blood in the urine    Increasing low back pain, not related to injury    Symptoms of urinary infection (increased urge to urinate, burning when passing urine, foul-smelling urine)    6201-3917 [employer]. [address] [place] [address], [place] [address]. All rights reserved. This information is not intended as a substitute for professional medical care. Always follow your healthcare professional's instructions.              Follow-ups after your visit        Your next 10 appointments already scheduled     Feb 16, 2017  3:30 PM   Office Visit with Carlos Joseph PA-C   [facility] ([facility])    [address] [place] [address]   [phone]           Bring a current list of meds and any records pertaining to this visit.  For Physicals, please bring immunization  "records and any forms needing to be filled out.  Please arrive 10 minutes early to complete paperwork.              Future tests that were ordered for you today     Open Future Orders        Priority Expected Expires Ordered    CT Maxillofacial w/o Contrast Routine  2018            Who to contact     If you have questions or need follow up information about today's clinic visit or your schedule please contact [facility] directly at [phone].  Normal or non-critical lab and imaging results will be communicated to you by Photozeenhart, letter or phone within 4 business days after the clinic has received the results. If you do not hear from us within 7 days, please contact the clinic through [employer] or phone. If you have a critical or abnormal lab result, we will notify you by phone as soon as possible.  Submit refill requests through [employer] or call your pharmacy and they will forward the refill request to us. Please allow 3 business days for your refill to be completed.          Additional Information About Your Visit        Cuipo Information     [employer] lets you send messages to your doctor, view your test results, renew your prescriptions, schedule appointments and more. To sign up, go to [email or web address] . Click on \"Log in\" on the left side of the screen, which will take you to the Welcome page. Then click on \"Sign up Now\" on the right side of the page.     You will be asked to enter the access code listed below, as well as some personal information. Please follow the directions to create your username and password.     Your access code is: [email or web address]  Expires: 2017  8:53 AM     Your access code will  in 90 days. If you need help or a new code, please call your [facility] or [phone].        Care EveryWhere ID     This is your Care EveryWhere ID. This could be used by other organizations to access your [facility] medical records  [id number]        Your " Vitals Were     Pulse Temperature Respirations Pulse Oximetry          70 98.7  F (37.1  C) (Oral) 12 98%         Blood Pressure from Last 3 Encounters:   02/09/17 134/86   01/30/17 132/70   01/23/17 138/88    Weight from Last 3 Encounters:   02/09/17 174 lb (78.926 kg)   02/08/17 173 lb (78.472 kg)   01/23/17 175 lb (79.379 kg)              We Performed the Following     *UA reflex to Microscopic and Culture ([facility] and [facility] (except [place] and [place])     PSA, screen     Urine Culture Aerobic Bacterial          Today's Medication Changes          These changes are accurate as of: 2/9/17  3:35 PM.  If you have any questions, ask your nurse or doctor.               Start taking these medicines.        Dose/Directions    terazosin 2 MG capsule   Commonly known as:  HYTRIN   Used for:  Benign non-nodular prostatic hyperplasia with lower urinary tract symptoms   Started by:  Tish Phipps PA-C        Dose:  2 mg   Take 1 capsule (2 mg) by mouth At Bedtime   Quantity:  30 capsule   Refills:  1            Where to get your medicines      These medications were sent to [facility] [address] [place] [address] [facility] AT [facility] of Replaced by [facility] [address]  [address] [facility] [address]     Phone:  [phone]    - terazosin 2 MG capsule             Primary Care Provider Office Phone # Fax #    Tami Comfort Banuelos -[phone]       [address] [address]        Thank you!     Thank you for choosing [facility]  for your care. Our goal is always to provide you with excellent care. Hearing back from our patients is one way we can continue to improve our services. Please take a few minutes to complete the written survey that you may receive in the mail after your visit with us. Thank you!             Your Updated Medication List - Protect others around you: Learn how to safely use, store and throw  away your medicines at www.disposemymeds.org.          This list is accurate as of: 2/9/17  3:35 PM.  Always use your most recent med list.                   Brand Name Dispense Instructions for use    atorvastatin 10 MG tablet    LIPITOR    90 tablet    Take 1 tablet (10 mg) by mouth daily       fluticasone 50 MCG/ACT spray    FLONASE    1 Bottle    Spray 1-2 sprays into both nostrils daily       scopolamine 72 hr patch    TRANSDERM-SCOP (1.5 MG)    4 patch    Place 1 patch onto the skin every 72 hours Apply to hairless area behind one ear at least 4 hours before travel.       terazosin 2 MG capsule    HYTRIN    30 capsule    Take 1 capsule (2 mg) by mouth At Bedtime

## 2017-02-10 ENCOUNTER — TELEPHONE (OUTPATIENT)
Dept: FAMILY MEDICINE | Facility: OTHER | Age: 57
End: 2017-02-10

## 2017-02-10 LAB — PSA SERPL-ACNC: 0.62 UG/L (ref 0–4)

## 2017-02-10 NOTE — TELEPHONE ENCOUNTER
LM for pt to return our call please give message from below. Vashti Lara CMA ([facility])

## 2017-02-10 NOTE — TELEPHONE ENCOUNTER
----- Message from Tish Phipps PA-C sent at 2/10/2017 12:17 PM CST -----  Please call patient with the following message    PSA was decreased from 1 year ago, in normal range. No concerns for prostate cancer.     Buddy Phipps PA-C

## 2017-02-10 NOTE — PROGRESS NOTES
Quick Note:    Please call patient with the following message    PSA was decreased from 1 year ago, in normal range. No concerns for prostate cancer.     Buddy Phipps PA-C    ______

## 2017-02-12 LAB
BACTERIA SPEC CULT: NO GROWTH
MICRO REPORT STATUS: NORMAL
SPECIMEN SOURCE: NORMAL

## 2017-02-16 ENCOUNTER — OFFICE VISIT (OUTPATIENT)
Dept: FAMILY MEDICINE | Facility: OTHER | Age: 57
End: 2017-02-16
Payer: COMMERCIAL

## 2017-02-16 VITALS
HEART RATE: 63 BPM | WEIGHT: 172 LBS | OXYGEN SATURATION: 98 % | HEIGHT: 63 IN | SYSTOLIC BLOOD PRESSURE: 128 MMHG | BODY MASS INDEX: 30.48 KG/M2 | DIASTOLIC BLOOD PRESSURE: 84 MMHG | TEMPERATURE: 97.9 F | RESPIRATION RATE: 16 BRPM

## 2017-02-16 DIAGNOSIS — R39.15 URINARY URGENCY: ICD-10-CM

## 2017-02-16 DIAGNOSIS — N40.1 BENIGN NON-NODULAR PROSTATIC HYPERPLASIA WITH LOWER URINARY TRACT SYMPTOMS: Primary | ICD-10-CM

## 2017-02-16 DIAGNOSIS — E87.5 HYPERKALEMIA: ICD-10-CM

## 2017-02-16 DIAGNOSIS — R03.0 ELEVATED BLOOD PRESSURE READING WITHOUT DIAGNOSIS OF HYPERTENSION: ICD-10-CM

## 2017-02-16 DIAGNOSIS — E78.5 HYPERLIPIDEMIA LDL GOAL <130: ICD-10-CM

## 2017-02-16 PROCEDURE — 84132 ASSAY OF SERUM POTASSIUM: CPT | Performed by: PHYSICIAN ASSISTANT

## 2017-02-16 PROCEDURE — 36415 COLL VENOUS BLD VENIPUNCTURE: CPT | Performed by: PHYSICIAN ASSISTANT

## 2017-02-16 PROCEDURE — 99214 OFFICE O/P EST MOD 30 MIN: CPT | Performed by: PHYSICIAN ASSISTANT

## 2017-02-16 RX ORDER — TERAZOSIN 10 MG/1
10 CAPSULE ORAL AT BEDTIME
Qty: 30 CAPSULE | Refills: 0 | Status: SHIPPED | OUTPATIENT
Start: 2017-02-16 | End: 2018-04-03

## 2017-02-16 ASSESSMENT — PAIN SCALES - GENERAL: PAINLEVEL: NO PAIN (0)

## 2017-02-16 NOTE — LETTER
[address]  [facility] [address]  [phone]      February 17, 2017      Jorge Jeffers  [id number] [place] [address]            Ketan,    Your potassium is back to normal. You can still eat bananas and nuts but just avoid excess consumption. Thanks.          Sincerely,        Carlos Joseph PA-C

## 2017-02-16 NOTE — NURSING NOTE
"Chief Complaint   Patient presents with     Hypertension     Panel Management     my chart, flu, honoring choices, care everywhere,        Initial /90 (BP Location: Right arm, Patient Position: Chair, Cuff Size: Adult Regular)  Pulse 63  Temp 97.9  F (36.6  C) (Temporal)  Resp 16  Ht 5' 2.5\" (1.588 m)  Wt 172 lb (78 kg)  SpO2 98%  BMI 30.96 kg/m2 Estimated body mass index is 30.96 kg/(m^2) as calculated from the following:    Height as of this encounter: 5' 2.5\" (1.588 m).    Weight as of this encounter: 172 lb (78 kg).  Medication Reconciliation: complete     Naomi Retana, SANDOR      "

## 2017-02-16 NOTE — MR AVS SNAPSHOT
After Visit Summary   2/16/2017    Jorge Jeffers    MRN: [id number]           Patient Information     Date Of Birth          1960        Visit Information        Provider Department      2/16/2017 3:30 PM Carlos Joseph PA-C [facility]        Today's Diagnoses     Benign non-nodular prostatic hyperplasia with lower urinary tract symptoms    -  1    Urinary urgency        Hyperkalemia        Elevated blood pressure reading without diagnosis of hypertension        Hyperlipidemia LDL goal <130          Care Instructions    I recommend you increase the Hytrin to 6 mg nightly for 5 days then 8 mg nightly for 5 days then 10 mg nightly.  If symptoms are not improving over the next 2-4 weeks, let me know and I will send you to urology.  Drink plenty of fluids.     Check blood pressure a couple days per week at work. If consistently above 140/90, let me know.  Work on a low salt diet and continue with exercise.    Follow up in 3 months with repeat cholesterol.            Follow-ups after your visit        Future tests that were ordered for you today     Open Future Orders        Priority Expected Expires Ordered    Lipid Profile with reflex to direct LDL Routine 5/16/2017 6/16/2017 2/16/2017            Who to contact     If you have questions or need follow up information about today's clinic visit or your schedule please contact [facility] directly at [phone].  Normal or non-critical lab and imaging results will be communicated to you by MyChart, letter or phone within 4 business days after the clinic has received the results. If you do not hear from us within 7 days, please contact the clinic through MyChart or phone. If you have a critical or abnormal lab result, we will notify you by phone as soon as possible.  Submit refill requests through CriticalBlue or call your pharmacy and they will forward the refill request to us. Please allow 3 business days for your  "refill to be completed.          Additional Information About Your Visit        [employer] Information     [employer] lets you send messages to your doctor, view your test results, renew your prescriptions, schedule appointments and more. To sign up, go to [email or web address] . Click on \"Log in\" on the left side of the screen, which will take you to the Welcome page. Then click on \"Sign up Now\" on the right side of the page.     You will be asked to enter the access code listed below, as well as some personal information. Please follow the directions to create your username and password.     Your access code is: [email or web address]  Expires: 2017  8:53 AM     Your access code will  in 90 days. If you need help or a new code, please call your [facility] clinic or [phone].        Care EveryWhere ID     This is your Care EveryWhere ID. This could be used by other organizations to access your [facility] medical records  [id number]        Your Vitals Were     Pulse Temperature Respirations Height Pulse Oximetry BMI (Body Mass Index)    63 97.9  F (36.6  C) (Temporal) 16 5' 2.5\" (1.588 m) 98% 30.96 kg/m2       Blood Pressure from Last 3 Encounters:   17 134/90   17 134/86   17 132/70    Weight from Last 3 Encounters:   17 172 lb (78 kg)   17 174 lb (78.9 kg)   17 173 lb (78.5 kg)              We Performed the Following     Potassium          Today's Medication Changes          These changes are accurate as of: 17  4:00 PM.  If you have any questions, ask your nurse or doctor.               These medicines have changed or have updated prescriptions.        Dose/Directions    * terazosin 2 MG capsule   Commonly known as:  HYTRIN   This may have changed:  Another medication with the same name was added. Make sure you understand how and when to take each.   Used for:  Benign non-nodular prostatic hyperplasia with lower urinary tract symptoms        Dose:  2 mg   Take 1 capsule (2 " mg) by mouth At Bedtime   Quantity:  30 capsule   Refills:  1       * terazosin 10 MG capsule   Commonly known as:  HYTRIN   This may have changed:  You were already taking a medication with the same name, and this prescription was added. Make sure you understand how and when to take each.   Used for:  Benign non-nodular prostatic hyperplasia with lower urinary tract symptoms        Dose:  10 mg   Take 1 capsule (10 mg) by mouth At Bedtime   Quantity:  30 capsule   Refills:  0       * Notice:  This list has 2 medication(s) that are the same as other medications prescribed for you. Read the directions carefully, and ask your doctor or other care provider to review them with you.         Where to get your medicines      These medications were sent to [facility] [address] [place] [address] [facility] AT [place] [address] & [facility]  [address] [facility] [address]     Phone:  [phone]     terazosin 10 MG capsule                Primary Care Provider Office Phone # Fax #    Tami Banuelos -[phone]       [address] [address]        Thank you!     Thank you for choosing [facility]  for your care. Our goal is always to provide you with excellent care. Hearing back from our patients is one way we can continue to improve our services. Please take a few minutes to complete the written survey that you may receive in the mail after your visit with us. Thank you!             Your Updated Medication List - Protect others around you: Learn how to safely use, store and throw away your medicines at www.disposemymeds.org.          This list is accurate as of: 2/16/17  4:00 PM.  Always use your most recent med list.                   Brand Name Dispense Instructions for use    atorvastatin 10 MG tablet    LIPITOR    90 tablet    Take 1 tablet (10 mg) by mouth daily       fluticasone 50 MCG/ACT spray    FLONASE    1 Bottle    Spray 1-2  sprays into both nostrils daily       scopolamine 72 hr patch    TRANSDERM-SCOP (1.5 MG)    4 patch    Place 1 patch onto the skin every 72 hours Apply to hairless area behind one ear at least 4 hours before travel.       * terazosin 2 MG capsule    HYTRIN    30 capsule    Take 1 capsule (2 mg) by mouth At Bedtime       * terazosin 10 MG capsule    HYTRIN    30 capsule    Take 1 capsule (10 mg) by mouth At Bedtime       * Notice:  This list has 2 medication(s) that are the same as other medications prescribed for you. Read the directions carefully, and ask your doctor or other care provider to review them with you.

## 2017-02-16 NOTE — PATIENT INSTRUCTIONS
I recommend you increase the Hytrin to 6 mg nightly for 5 days then 8 mg nightly for 5 days then 10 mg nightly.  If symptoms are not improving over the next 2-4 weeks, let me know and I will send you to urology.  Drink plenty of fluids.     Check blood pressure a couple days per week at work. If consistently above 140/90, let me know.  Work on a low salt diet and continue with exercise.    Follow up in 3 months with repeat cholesterol.

## 2017-02-17 LAB — POTASSIUM SERPL-SCNC: 3.9 MMOL/L (ref 3.4–5.3)

## 2017-03-05 DIAGNOSIS — N40.1 BENIGN NON-NODULAR PROSTATIC HYPERPLASIA WITH LOWER URINARY TRACT SYMPTOMS: ICD-10-CM

## 2017-03-05 DIAGNOSIS — T75.3XXA MOTION SICKNESS, INITIAL ENCOUNTER: ICD-10-CM

## 2017-03-06 NOTE — TELEPHONE ENCOUNTER
Terazosin 2 mg      Last Written Prescription Date: 02/09/2017  Last Fill Quantity: 30,  # refills: 1   Last Office Visit with [facility], [facility] or [facility] prescribing provider: 02/16/2017

## 2017-03-07 NOTE — TELEPHONE ENCOUNTER
Patient should be on the 10 mg dose by now. Please clarify this with him - request is for 2 mg tablets. The 10 mg was sent on 2/16/17.    Tootie Beverly, RN, BSN

## 2017-03-08 RX ORDER — SCOLOPAMINE TRANSDERMAL SYSTEM 1 MG/1
1 PATCH, EXTENDED RELEASE TRANSDERMAL
Qty: 4 PATCH | Refills: 11 | Status: CANCELLED | OUTPATIENT
Start: 2017-03-08

## 2017-03-08 RX ORDER — TERAZOSIN 2 MG/1
CAPSULE ORAL
Qty: 90 CAPSULE | Refills: 0 | Status: SHIPPED | OUTPATIENT
Start: 2017-03-08 | End: 2018-04-03

## 2017-03-08 NOTE — TELEPHONE ENCOUNTER
Patient informed. He declined evisit but did schedule phone visit for tomorrow.  Naomi Retana, CMA

## 2017-03-09 ENCOUNTER — VIRTUAL VISIT (OUTPATIENT)
Dept: FAMILY MEDICINE | Facility: OTHER | Age: 57
End: 2017-03-09
Payer: COMMERCIAL

## 2017-03-09 DIAGNOSIS — T75.3XXA MOTION SICKNESS, INITIAL ENCOUNTER: Primary | ICD-10-CM

## 2017-03-09 PROCEDURE — 99441 ZZC PHYSICIAN TELEPHONE EVALUATION 5-10 MIN: CPT | Performed by: FAMILY MEDICINE

## 2017-03-09 RX ORDER — SCOLOPAMINE TRANSDERMAL SYSTEM 1 MG/1
PATCH, EXTENDED RELEASE TRANSDERMAL
Qty: 4 PATCH | Refills: 0 | Status: SHIPPED | OUTPATIENT
Start: 2017-03-09 | End: 2018-04-03

## 2017-03-09 NOTE — MR AVS SNAPSHOT
After Visit Summary   3/9/2017    Jorge Jeffers    MRN: [id number]           Patient Information     Date Of Birth          1960        Visit Information        Provider Department      3/9/2017 12:15 PM Tami Banuelos MD [facility]        Today's Diagnoses     Motion sickness, initial encounter    -  1       Follow-ups after your visit        Who to contact     If you have questions or need follow up information about today's clinic visit or your schedule please contact [facility] directly at [phone].  Normal or non-critical lab and imaging results will be communicated to you by [employer], letter or phone within 4 business days after the clinic has received the results. If you do not hear from us within 7 days, please contact the clinic through [employer] or phone. If you have a critical or abnormal lab result, we will notify you by phone as soon as possible.  Submit refill requests through [employer] or call your pharmacy and they will forward the refill request to us. Please allow 3 business days for your refill to be completed.          Additional Information About Your Visit        MyChart Information     [employer] gives you secure access to your electronic health record. If you see a primary care provider, you can also send messages to your care team and make appointments. If you have questions, please call your primary care clinic.  If you do not have a primary care provider, please call [phone] and they will assist you.        Care EveryWhere ID     This is your Care EveryWhere ID. This could be used by other organizations to access your [place] medical records  [id number]         Blood Pressure from Last 3 Encounters:   02/16/17 128/84   02/09/17 134/86   01/30/17 132/70    Weight from Last 3 Encounters:   02/16/17 172 lb (78 kg)   02/09/17 174 lb (78.9 kg)   02/08/17 173 lb (78.5 kg)              Today, you had the following     No orders found  for display         Today's Medication Changes          These changes are accurate as of: 3/9/17  1:00 PM.  If you have any questions, ask your nurse or doctor.               These medicines have changed or have updated prescriptions.        Dose/Directions    * scopolamine 72 hr patch   Commonly known as:  TRANSDERM-SCOP (1.5 MG)   This may have changed:  Another medication with the same name was added. Make sure you understand how and when to take each.   Used for:  Motion sickness, initial encounter   Changed by:  Samantha Portillo APRN CNP        Dose:  1 patch   Place 1 patch onto the skin every 72 hours Apply to hairless area behind one ear at least 4 hours before travel.   Quantity:  4 patch   Refills:  11       * scopolamine 72 hr patch   Commonly known as:  TRANSDERM   This may have changed:  You were already taking a medication with the same name, and this prescription was added. Make sure you understand how and when to take each.   Used for:  Motion sickness, initial encounter   Changed by:  Tami Banuelos MD        Apply 1 patch to hairless area behind one ear at least 4 hours before travel.  Remove old patch and change every 3 days (72 hours).   Quantity:  4 patch   Refills:  0       * Notice:  This list has 2 medication(s) that are the same as other medications prescribed for you. Read the directions carefully, and ask your doctor or other care provider to review them with you.         Where to get your medicines      These medications were sent to [facility] [address] [place] [address] [facility] AT [facility] [address]  [address] [facility], [facility] [address]     Phone:  [phone]     scopolamine 72 hr patch                Primary Care Provider Office Phone # Fax #    Tami Banuelos [phone]       [facility]  [address] [place] [address]        Thank you!     Thank you for choosing [facility]  for your care. Our  goal is always to provide you with excellent care. Hearing back from our patients is one way we can continue to improve our services. Please take a few minutes to complete the written survey that you may receive in the mail after your visit with us. Thank you!             Your Updated Medication List - Protect others around you: Learn how to safely use, store and throw away your medicines at www.disposemymeds.org.          This list is accurate as of: 3/9/17  1:00 PM.  Always use your most recent med list.                   Brand Name Dispense Instructions for use    atorvastatin 10 MG tablet    LIPITOR    90 tablet    Take 1 tablet (10 mg) by mouth daily       fluticasone 50 MCG/ACT spray    FLONASE    1 Bottle    Spray 1-2 sprays into both nostrils daily       * scopolamine 72 hr patch    TRANSDERM-SCOP (1.5 MG)    4 patch    Place 1 patch onto the skin every 72 hours Apply to hairless area behind one ear at least 4 hours before travel.       * scopolamine 72 hr patch    TRANSDERM    4 patch    Apply 1 patch to hairless area behind one ear at least 4 hours before travel.  Remove old patch and change every 3 days (72 hours).       * terazosin 10 MG capsule    HYTRIN    30 capsule    Take 1 capsule (10 mg) by mouth At Bedtime       * terazosin 2 MG capsule    HYTRIN    90 capsule    TAKE 1 CAPSULE(2 MG) BY MOUTH AT BEDTIME       * Notice:  This list has 4 medication(s) that are the same as other medications prescribed for you. Read the directions carefully, and ask your doctor or other care provider to review them with you.

## 2017-03-09 NOTE — PROGRESS NOTES
"Jorge Jeffers is a 56 year old male who is being evaluated via a telephone visit.      The patient has been notified of following:     \"This telephone visit will be conducted via a call between you and your physician/provider. We have found that certain health care needs can be provided without the need for a physical exam.  This service lets us provide the care you need with a short phone conversation.  If a prescription is necessary we can send it directly to your pharmacy.  If lab work is needed we can place an order for that and you can then stop by our lab to have the test done at a later time.    We will bill your insurance company for this service.  Please check with your medical insurance if this type of visit is covered. You may be responsible for the cost of this type of visit if insurance coverage is denied.  The typical cost is $30 (10min), $59 (11-20min) and $85 (21-30min).  Most often these visits are shorter than 10 minutes.    If during the course of the call the physician/provider feels a telephone visit is not appropriate, you will not be charged for this service.\"       Consent has been obtained for this service by 2 care team members: yes. See the scanned image in the medical record.    Jorge Jeffers complains of  Medication Request (motion sickness patch)      I have reviewed and updated the patient's Past Medical History, Social History, Family History and Medication List.    ALLERGIES  No known drug allergies    Naomi Retana CMA  (MA signature)    Additional provider notes: motion patch -- has had multiple times in his life.  Gets it with boats or cars.  Sometimes he can get this with just turning his head too quick.  Has used meclizine in past and worked, but gets him real tired.  Patch not so much.    BPPV suggestive symptoms on description.  Had evaluation with ENT at  in past with MRI and such.  Was unclear diagnosis, so was referred to neurology and chose not to do " this.  Mother, and son both have this as well.    Traveling soon.  Needs enough for 2 weeks, so 4 patches to cover him. Will consider further evaluation when he returns.  Will talk to his wife for which ENT and sign off for records as well.    Assessment/Plan:  1. Motion sickness, initial encounter          I have reviewed the note as documented above.  This accurately captures the substance of my conversation with the patient,  Tami Banuelos MD     Total time of call between patient and provider was 9 minutes ;

## 2018-03-06 ENCOUNTER — TRANSFERRED RECORDS (OUTPATIENT)
Dept: HEALTH INFORMATION MANAGEMENT | Facility: CLINIC | Age: 58
End: 2018-03-06

## 2018-03-12 ENCOUNTER — TELEPHONE (OUTPATIENT)
Dept: FAMILY MEDICINE | Facility: OTHER | Age: 58
End: 2018-03-12

## 2018-03-12 NOTE — TELEPHONE ENCOUNTER
Has seen JM last and can leave him first opportunity, I would refer to Dr. Yung otherwise/.  Tami Banuelos MD

## 2018-03-12 NOTE — TELEPHONE ENCOUNTER
Reason for Call: Request for an order or referral:    Order or referral being requested: hormone replacement therapy for low testosterone     Date needed: as soon as possible    Has the patient been seen by the PCP for this problem? NO    Additional comments: is wondering if he could be referred to someone with in [place].     Phone number Patient can be reached at:  Other phone number:  Catie simms [phone]    Best Time:  any    Can we leave a detailed message on this number?  YES    Call taken on 3/12/2018 at 3:19 PM by Radha Miller

## 2018-03-13 NOTE — TELEPHONE ENCOUNTER
He may not need a referral. See if he can just get set up with Dr. Yung but he should bring in his testosterone labs result from the clinic he went to.    Carlos Joseph PA-C

## 2018-03-13 NOTE — TELEPHONE ENCOUNTER
Patient said he had low testosterone when he was seen at [facility].   Does he even need a referral?

## 2018-03-13 NOTE — TELEPHONE ENCOUNTER
Left message for patient to return call, please refer to JM message and assist in scheduling OV if appropriate

## 2018-03-13 NOTE — TELEPHONE ENCOUNTER
Does he have a history of low testosterone? If not, he should have a visit to discuss this and get labs and then he can be referred appropriately.    Carlos Joseph PA-C

## 2018-04-03 ENCOUNTER — OFFICE VISIT (OUTPATIENT)
Dept: UROLOGY | Facility: CLINIC | Age: 58
End: 2018-04-03
Attending: OTOLARYNGOLOGY
Payer: COMMERCIAL

## 2018-04-03 VITALS — SYSTOLIC BLOOD PRESSURE: 182 MMHG | HEART RATE: 68 BPM | OXYGEN SATURATION: 99 % | DIASTOLIC BLOOD PRESSURE: 88 MMHG

## 2018-04-03 DIAGNOSIS — Z53.9 ERRONEOUS ENCOUNTER--DISREGARD: Primary | ICD-10-CM

## 2018-04-03 NOTE — MR AVS SNAPSHOT
After Visit Summary   4/3/2018    Jorge Jeffers    MRN: [id number]           Patient Information     Date Of Birth          1960        Visit Information        Provider Department      4/3/2018 2:30 PM Mauri Yung MD [facility]        Today's Diagnoses     ERRONEOUS ENCOUNTER--DISREGARD    -  1       Follow-ups after your visit        Your next 10 appointments already scheduled     Apr 06, 2018 12:00 PM CDT   Return [facility] [address] [phone]              Who to contact     If you have questions or need follow up information about today's clinic visit or your schedule please contact [facility] directly at [phone].  Normal or non-critical lab and imaging results will be communicated to you by MyChart, letter or phone within 4 business days after the clinic has received the results. If you do not hear from us within 7 days, please contact the clinic through MyChart or phone. If you have a critical or abnormal lab result, we will notify you by phone as soon as possible.  Submit refill requests through [employer] or call your pharmacy and they will forward the refill request to us. Please allow 3 business days for your refill to be completed.          Additional Information About Your Visit        MyChart Information     [employer] gives you secure access to your electronic health record. If you see a primary care provider, you can also send messages to your care team and make appointments. If you have questions, please call your primary care clinic.  If you do not have a primary care provider, please call [phone] and they will assist you.        Care EveryWhere ID     This is your Care EveryWhere ID. This could be used by other organizations to access your [place] medical records  [id number]        Your Vitals Were     Pulse Pulse  Oximetry                68 99%           Blood Pressure from Last 3 Encounters:   04/03/18 182/88   02/16/17 128/84   02/09/17 134/86    Weight from Last 3 Encounters:   02/16/17 172 lb (78 kg)   02/09/17 174 lb (78.9 kg)   02/08/17 173 lb (78.5 kg)              Today, you had the following     No orders found for display         Today's Medication Changes          These changes are accurate as of 4/3/18 11:59 PM.  If you have any questions, ask your nurse or doctor.               Stop taking these medicines if you haven't already. Please contact your care team if you have questions.     atorvastatin 10 MG tablet   Commonly known as:  LIPITOR           fluticasone 50 MCG/ACT spray   Commonly known as:  FLONASE           scopolamine 72 hr patch   Commonly known as:  TRANSDERM           terazosin 10 MG capsule   Commonly known as:  HYTRIN           terazosin 2 MG capsule   Commonly known as:  HYTRIN                    Primary Care Provider Office Phone # Fax #    Tami Comfort Banuelos [phone]       [address] [place] [address]        Equal Access to Services     [facility]: [facility] had[facility]o Soomaali, waaxda luqadaha, qaybta kaalmada adeegcourtney, martha horan . So [facility] [phone].    ATENCIÓN: Si habla español, tiene a barrientos disposición servicios gratuitos de asistencia lingüística. [facility] [phone].    We comply with applicable federal civil rights laws and [place] laws. We do not discriminate on the basis of race, color, national origin, age, disability, sex, sexual orientation, or gender identity.            Thank you!     Thank you for choosing [facility]  for your care. Our goal is always to provide you with excellent care. Hearing back from our patients is one way we can continue to improve our services. Please take a few minutes to complete the written survey that you may receive in the mail after your visit with us. Thank you!              Your Updated Medication List - Protect others around you: Learn how to safely use, store and throw away your medicines at www.disposemymeds.org.          This list is accurate as of 4/3/18 11:59 PM.  Always use your most recent med list.                   Brand Name Dispense Instructions for use Diagnosis    OMEPRAZOLE PO

## 2018-04-06 ENCOUNTER — OFFICE VISIT (OUTPATIENT)
Dept: UROLOGY | Facility: CLINIC | Age: 58
End: 2018-04-06
Payer: COMMERCIAL

## 2018-04-06 VITALS — DIASTOLIC BLOOD PRESSURE: 85 MMHG | SYSTOLIC BLOOD PRESSURE: 176 MMHG | HEART RATE: 62 BPM | OXYGEN SATURATION: 98 %

## 2018-04-06 DIAGNOSIS — R68.82 LOW LIBIDO: Primary | ICD-10-CM

## 2018-04-06 DIAGNOSIS — R03.0 ELEVATED BLOOD PRESSURE READING WITHOUT DIAGNOSIS OF HYPERTENSION: ICD-10-CM

## 2018-04-06 PROCEDURE — 99243 OFF/OP CNSLTJ NEW/EST LOW 30: CPT | Performed by: UROLOGY

## 2018-04-06 NOTE — PROGRESS NOTES
Visit Date:   2018      SUBJECTIVE: The patient is a pleasant 57-year-old  male who was requested to be seen by Dr. Tami Banuelos for a consultation with regard to patient's low testosterone.  The patient was seen at [facility] recently and had a testosterone done later in the afternoon that showed a value of 194.  He was seen by them initially because of low libido and decreased energy level.  He exercises about 5 times a week and has no problems with erections or urination issue.  He is healthy otherwise, without any significant medical problems.      ASSESSMENT:  Pleasant 57-year-old gentleman with low libido with recent low testosterone and recent blood tests.      RECOMMENDATIONS:  We will repeat his testosterone along with FSH LH and prolactin in the morning.  Treatment options for testosterone placement therapy discussed at length.  The risks of prostate cancer, polycythemia increased risk of cardiovascular events discussed at length.  Once we know the result of his testosterone, we will contact him to see whether he wants any testosterone replacement therapy knowing the above risks.  Noted his blood pressure is slightly elevated.  He will follow up with his primary care physician for that also.         ALYSE OBONE MD             D: 2018   T: 2018   MT: CLAY      Name:     NITESH VALLADARES   MRN:      [id number]        Account:      [id number]   :      1960           Visit Date:   2018      Document: [id number]

## 2018-04-06 NOTE — PROGRESS NOTES
S: See dictated note   Current Outpatient Prescriptions   Medication Sig Dispense Refill     OMEPRAZOLE PO        Allergies   Allergen Reactions     No Known Drug Allergies      No past medical history on file.  Past Surgical History:   Procedure Laterality Date     COLONOSCOPY N/A 3/28/2016    Procedure: COLONOSCOPY;  Surgeon: Tammi Mccoy MD;  Location: [facility] OR     COLONOSCOPY N/A 3/28/2016    Procedure: COMBINED COLONOSCOPY, SINGLE OR MULTIPLE BIOPSY/POLYPECTOMY BY BIOPSY;  Surgeon: Tammi Mccoy MD;  Location: [facility] OR     COLONOSCOPY WITH CO2 INSUFFLATION N/A 3/28/2016    Procedure: COLONOSCOPY WITH CO2 INSUFFLATION;  Surgeon: Tammi Mccoy MD;  Location: [facility] OR      Family History   Problem Relation Age of Onset     HEART DISEASE Father      stent     Hypertension Father      Cardiovascular Father      stent     HEART DISEASE Paternal Grandmother      Cardiovascular Paternal Grandmother      CEREBROVASCULAR DISEASE Paternal Uncle      Asthma No family hx of      C.A.D. No family hx of      DIABETES No family hx of      Breast Cancer No family hx of      Cancer - colorectal No family hx of      Prostate Cancer No family hx of      Alzheimer Disease No family hx of      Arthritis No family hx of      Blood Disease No family hx of      CANCER No family hx of      Circulatory No family hx of      Eye Disorder No family hx of      GASTROINTESTINAL DISEASE No family hx of      Genitourinary Problems No family hx of      Lipids No family hx of      Musculoskeletal Disorder No family hx of      Neurologic Disorder No family hx of      Respiratory No family hx of      Thyroid Disease No family hx of      Other Cancer No family hx of      Depression No family hx of      MENTAL ILLNESS No family hx of      Substance Abuse No family hx of      Anesthesia Reaction No family hx of      Anxiety Disorder No family hx of      OSTEOPOROSIS No family hx of      Genetic Disorder No family hx of      Colon Cancer No  family hx of      Hyperlipidemia No family hx of      Coronary Artery Disease No family hx of      Social History     Social History     Marital status: Unknown     Spouse name: N/A     Number of children: N/A     Years of education: N/A     Social History Main Topics     Smoking status: Former Smoker     Types: Cigars     Smokeless tobacco: Never Used      Comment: occasioanlly     Alcohol use Yes      Comment: 1 day a week     Drug use: No     Sexual activity: Yes     Partners: Female     Birth control/ protection: Surgical      Comment: tubal ligation     Other Topics Concern     Parent/Sibling W/ Cabg, Mi Or Angioplasty Before 65f 55m? No     Social History Narrative       REVIEW OF SYSTEMS  =================  C: NEGATIVE for fever, chills, change in weight  I: NEGATIVE for worrisome rashes, moles or lesions  E/M: NEGATIVE for ear, mouth and throat problems  R: NEGATIVE for significant cough or SHORTNESS OF BREATH  CV:  NEGATIVE for chest pain, palpitations or peripheral edema  GI: NEGATIVE for nausea, abdominal pain, heartburn, or change in bowel habits  NEURO: NEGATIVE numbness/weakness  : see HPI  PSYCH: NEGATIVE depression/anxiety  LYmph: no new enlarged lymph nodes  Ortho: no new trauma/movements      Physical Exam:  /85 (BP Location: Right arm, Patient Position: Chair, Cuff Size: Adult Regular)  Pulse 62  SpO2 98%   Patient is pleasant, in no acute distress, good general condition.  HEENT:  Normalcephalic, atraumatic  Lung: no evidence of respiratory distress    Abdomen: Soft, nondistended, non tender. No masses. No rebound or guarding.   Exam: penis no d/c. Testis no masses.  No scrotal skin lesion.  Prostate 30 gm smooth.  Skin: Warm and dry.  No redness.  Neuro: grossly normal  Psych normal mood and affect  Musculoskeletal  moving all extremities    Assessment/Plan:   See dictated note     Elevated bp: Patient to follow up with Primary Care provider regarding elevated blood pressure.

## 2018-04-06 NOTE — MR AVS SNAPSHOT
After Visit Summary   4/6/2018    Jorge Jeffers    MRN: [id number]           Patient Information     Date Of Birth          1960        Visit Information        Provider Department      4/6/2018 11:45 AM Mauri Yung MD [facility]        Today's Diagnoses     Low libido    -  1    Elevated blood pressure reading without diagnosis of hypertension           Follow-ups after your visit        Future tests that were ordered for you today     Open Future Orders        Priority Expected Expires Ordered    Testosterone, total Routine  4/6/2019 4/6/2018    Follicle stimulating hormone Routine  4/6/2019 4/6/2018    Lutropin Routine  4/6/2019 4/6/2018    Prolactin Routine  4/6/2019 4/6/2018            Who to contact     If you have questions or need follow up information about today's clinic visit or your schedule please contact [facility] directly at [phone].  Normal or non-critical lab and imaging results will be communicated to you by MyChart, letter or phone within 4 business days after the clinic has received the results. If you do not hear from us within 7 days, please contact the clinic through [employer] or phone. If you have a critical or abnormal lab result, we will notify you by phone as soon as possible.  Submit refill requests through [employer] or call your pharmacy and they will forward the refill request to us. Please allow 3 business days for your refill to be completed.          Additional Information About Your Visit        MyChart Information     [employer] gives you secure access to your electronic health record. If you see a primary care provider, you can also send messages to your care team and make appointments. If you have questions, please call your primary care clinic.  If you do not have a primary care provider, please call [phone] and they will assist you.        Care EveryWhere ID     This is your Care EveryWhere ID. This could be used by  other organizations to access your [place] medical records  [id number]        Your Vitals Were     Pulse Pulse Oximetry                62 98%           Blood Pressure from Last 3 Encounters:   04/06/18 176/85   04/03/18 182/88   02/16/17 128/84    Weight from Last 3 Encounters:   02/16/17 172 lb (78 kg)   02/09/17 174 lb (78.9 kg)   02/08/17 173 lb (78.5 kg)               Primary Care Provider Office Phone # Fax #    Tami Comfort Banuelos [phone]       [address] [place] [address]        Equal Access to Services     [facility]: Hadii aad stephen hadasho Soelviraali, waaxda luqadaha, qaybta kaalmada adejamesyada, martha horan . So [facility] [phone].    ATENCIÓN: Si habla español, tiene a barrientos disposición servicios gratuitos de asistencia lingüística. Llame al [phone].    We comply with applicable federal civil rights laws and [place] laws. We do not discriminate on the basis of race, color, national origin, age, disability, sex, sexual orientation, or gender identity.            Thank you!     Thank you for choosing [facility]  for your care. Our goal is always to provide you with excellent care. Hearing back from our patients is one way we can continue to improve our services. Please take a few minutes to complete the written survey that you may receive in the mail after your visit with us. Thank you!             Your Updated Medication List - Protect others around you: Learn how to safely use, store and throw away your medicines at www.disposemymeds.org.          This list is accurate as of 4/6/18 12:17 PM.  Always use your most recent med list.                   Brand Name Dispense Instructions for use Diagnosis    OMEPRAZOLE PO

## 2018-04-09 NOTE — PATIENT INSTRUCTIONS
Preventive Health Recommendations  Male Ages 50   64    Yearly exam:             See your health care provider every year in order to  o   Review health changes.   o   Discuss preventive care.    o   Review your medicines if your doctor has prescribed any.     Have a cholesterol test every 5 years, or more frequently if you are at risk for high cholesterol/heart disease.     Have a diabetes test (fasting glucose) every three years. If you are at risk for diabetes, you should have this test more often.     Have a colonoscopy at age 50, or have a yearly FIT test (stool test). These exams will check for colon cancer.      Talk with your health care provider about whether or not a prostate cancer screening test (PSA) is right for you.    You should be tested each year for STDs (sexually transmitted diseases), if you re at risk.     Shots: Get a flu shot each year. Get a tetanus shot every 10 years.     Nutrition:    Eat at least 5 servings of fruits and vegetables daily.     Eat whole-grain bread, whole-wheat pasta and brown rice instead of white grains and rice.     Talk to your provider about Calcium and Vitamin D.     Lifestyle    Exercise for at least 150 minutes a week (30 minutes a day, 5 days a week). This will help you control your weight and prevent disease.     Limit alcohol to one drink per day.     No smoking.     Wear sunscreen to prevent skin cancer.     See your dentist every six months for an exam and cleaning.     See your eye doctor every 1 to 2 years.    Will start you on a medication called hydrochlorithiazide to take daily in the mornings to help with your blood pressure.  Follow up weekly with the nurses for BP checks for the next month.  Try to cut back on the salt in your diet and continue with routine exercise.  Consider buying a home arm cuff to check your blood pressure. We would like you consistently below 140/90.    I recommend you continue with meclizine as needed for the motion sickness,  dizziness. I also recommend the modified Epley maneuver at home 2-3 times daily a few days before going on a fishing trip or traveling in a car for a long period of time.  If not improving, let me know.    Continue with omeprazole a few times weekly as needed for heartburn. Try to avoid greasy, fatty, spicy foods and cut back on the alcohol and coffee.    Follow up in 1 month with updated labs.    Follow up annually as long as blood pressure is improving.

## 2018-04-09 NOTE — PROGRESS NOTES
SUBJECTIVE:   CC: Jorge Jeffers is an 57 year old male who presents for preventative health visit.     Physical   Annual:     Getting at least 3 servings of Calcium per day::  Yes    Bi-annual eye exam::  Yes    Dental care twice a year::  Yes    Sleep apnea or symptoms of sleep apnea::  None    Diet::  Regular (no restrictions)    Frequency of exercise::  4-5 days/week    Duration of exercise::  Greater than 60 minutes    Taking medications regularly::  Yes    Medication side effects::  None    Additional concerns today::  YES (Patient would like to discuss motion sickness. He previously was told about patches that help. He said things like fishing, sitting in the back of a vehicle, and turning around fast cause it.)          He has been seen by ENT for his motion sickness/dizziness in the past and had a normal brain MRI last year. He was referred to neurology but never followed up. Symptoms only occur if on a long car rid, when fishing, or if turning his head to back up a vehicle. He will notice slight dizziness but not a spinning sensation as well as nausea. He takes meclizine as needed which helps. He has also tried scopolamine patches which also help but they are quite expensive.      He was experiencing some increased urinary frequency and burning last year and was diagnosed with BPH but he states one day, he thinks he may have passed a stone and symptoms have been resolved since then with no urinary frequency, decreased stream, or nocturia.      Today's PHQ-2 Score:   PHQ-2 ( 1999 [employer]) 4/13/2018   Q1: Little interest or pleasure in doing things 0   Q2: Feeling down, depressed or hopeless 0   PHQ-2 Score 0   Q1: Little interest or pleasure in doing things Not at all   Q2: Feeling down, depressed or hopeless Not at all   PHQ-2 Score 0     Answers for HPI/ROS submitted by the patient on 4/13/2018   PHQ-2 Score: 0    Abuse: Current or Past(Physical, Sexual or Emotional)- No  Do you feel safe in your  "environment - Yes    Social History   Substance Use Topics     Smoking status: Former Smoker     Types: Cigars     Smokeless tobacco: Never Used      Comment: occasioanlly     Alcohol use Yes      Comment: 1 day a week     Alcohol Use 4/13/2018   If you drink alcohol do you typically have greater than 3 drinks per day OR greater than 7 drinks per week? No   No flowsheet data found.    Last PSA:   PSA   Date Value Ref Range Status   02/09/2017 0.62 0 - 4 ug/L Final     Comment:     Assay Method:  Chemiluminescence using Siemens Vista analyzer       Reviewed orders with patient. Reviewed health maintenance and updated orders accordingly - Yes    Reviewed and updated as needed this visit by clinical staff  Tobacco  Allergies  Meds  Problems  Med Hx  Surg Hx  Fam Hx  Soc Hx          Reviewed and updated as needed this visit by Provider  Allergies  Meds  Problems         Review of Systems   Constitutional: Negative for chills and fever.   HENT: Negative for congestion, ear pain, hearing loss and sore throat.    Eyes: Negative for pain and visual disturbance.   Respiratory: Negative for cough and shortness of breath.    Cardiovascular: Negative for chest pain, palpitations and peripheral edema.   Gastrointestinal: Positive for heartburn. Negative for abdominal pain, constipation, diarrhea, hematochezia and nausea.   Genitourinary: Positive for impotence. Negative for discharge, dysuria, frequency, genital sores, hematuria and urgency.   Musculoskeletal: Positive for myalgias. Negative for arthralgias and joint swelling.   Skin: Negative for rash.   Neurological: Negative for dizziness, weakness, headaches and paresthesias.   Psychiatric/Behavioral: Negative for mood changes. The patient is not nervous/anxious.        OBJECTIVE:   /90  Pulse 54  Temp 97.7  F (36.5  C) (Temporal)  Resp 16  Ht 5' 3\" (1.6 m)  Wt 172 lb (78 kg)  SpO2 98%  BMI 30.47 kg/m2    Physical Exam  GENERAL: healthy, alert and no " distress  EYES: Eyes grossly normal to inspection, PERRL and conjunctivae and sclerae normal. No nystagmus.   HENT: ear canals and TM's normal, nose and mouth without ulcers or lesions  NECK: no adenopathy, no asymmetry, masses, or scars and thyroid normal to palpation  RESP: lungs clear to auscultation - no rales, rhonchi or wheezes  CV: regular rate and rhythm, normal S1 S2, no S3 or S4, no murmur, click or rub, no peripheral edema and peripheral pulses strong  ABDOMEN: soft, nontender, no hepatosplenomegaly, no masses and bowel sounds normal   (male): normal male circumcised genitalia without lesions or urethral discharge, no hernia  MS: no gross musculoskeletal defects noted, no edema. FROM to all extremities. No spinal tenderness.   SKIN: no suspicious lesions or rashes  NEURO: Normal strength and tone, mentation intact and speech normal. Cranial nerves II-XII are grossly intact. DTRs are 2+/4 throughout and symmetric. Gait is stable.   PSYCH: mentation appears normal, affect normal/bright    ASSESSMENT/PLAN:       ICD-10-CM    1. Encounter for routine adult health examination without abnormal findings Z00.00    2. Benign essential hypertension I10 hydrochlorothiazide (HYDRODIURIL) 25 MG tablet     Basic metabolic panel  (Ca, Cl, CO2, Creat, Gluc, K, Na, BUN)     CANCELED: TSH with free T4 reflex   3. Hyperlipidemia LDL goal <130 E78.5    4. Gastroesophageal reflux disease, esophagitis presence not specified K21.9    5. Benign prostatic hyperplasia without lower urinary tract symptoms N40.0    6. Motion sickness, subsequent encounter T75.3XXD    7. Benign paroxysmal positional vertigo, unspecified laterality H81.10    8. Motion sickness, initial encounter T75.3XXA    9. Screening for diabetes mellitus Z13.1 CANCELED: Basic metabolic panel  (Ca, Cl, CO2, Creat, Gluc, K, Na, BUN)         2. Persistently elevated blood pressure so he meets the criteria for hypertension. He has a history of mild hyperkalemia in  the past so will try to avoid lisinopril as first line treatment and will try hydrochlorithiazide instead, 25 mg. I discussed common side effects and will plan to repeat BMP in 1 month. Will order TSH today. He denies any symptoms of sleep apnea such as persistent snoring or daytime drowsiness. I encouraged he continue with a low salt diet and routine exercise. He will follow up with the nurses weekly for the next month for routine blood pressure checks. I also recommend he get a new home cuff and monitor closely. If consistently greater than 140/90, he will let me know.     BP Readings from Last 6 Encounters:   04/13/18 140/90   04/06/18 176/85   04/03/18 182/88   02/16/17 128/84   02/09/17 134/86   01/30/17 132/70     3. He stopped Lipitor 6 months ago as he wanted to see how he would do without it. If cholesterol still elevated, will restart this.    4. Stable, he only uses omeprazole a few times per week. I discussed potential long-term side effects with this medication but he states the H2 blockers have not been helpful.    5. No further symptoms since possibly passing a kidney stone last year.     7-8. Continued intermittent motion sickness/dizziness in certain situation and with specific head movements. Symptoms are consistent with BPPV. There was no nystagmus on exam today but in his ENT note from last year, he had upward beating nystagmus. He will continue with meclizine as needed and I also recommend the modified Epley maneuver a few days before triggering events. Will not refill scopolamine at this time as it is too expensive.     9. BMP ordered.    Follow up annually.     COUNSELING:   Reviewed preventive health counseling, as reflected in patient instructions       Regular exercise       Healthy diet/nutrition    BP Screening:   Last 3 BP Readings:    BP Readings from Last 3 Encounters:   04/13/18 140/90   04/06/18 176/85   04/03/18 182/88        reports that he has quit smoking. His smoking use  "included Cigars. He has never used smokeless tobacco.    Estimated body mass index is 30.47 kg/(m^2) as calculated from the following:    Height as of this encounter: 5' 3\" (1.6 m).    Weight as of this encounter: 172 lb (78 kg).   Weight management plan: Discussed healthy diet and exercise guidelines and patient will follow up in 12 months in clinic to re-evaluate.    Counseling Resources:  ATP IV Guidelines  Pooled Cohorts Equation Calculator  FRAX Risk Assessment  ICSI Preventive Guidelines  Dietary Guidelines for Americans, 2010  USDA's MyPlate  ASA Prophylaxis  Lung CA Screening    Carlos Joseph PA-C  [facility]  "

## 2018-04-13 ENCOUNTER — OFFICE VISIT (OUTPATIENT)
Dept: FAMILY MEDICINE | Facility: OTHER | Age: 58
End: 2018-04-13
Payer: COMMERCIAL

## 2018-04-13 VITALS
DIASTOLIC BLOOD PRESSURE: 90 MMHG | HEIGHT: 63 IN | OXYGEN SATURATION: 98 % | WEIGHT: 172 LBS | RESPIRATION RATE: 16 BRPM | HEART RATE: 54 BPM | SYSTOLIC BLOOD PRESSURE: 140 MMHG | TEMPERATURE: 97.7 F | BODY MASS INDEX: 30.48 KG/M2

## 2018-04-13 DIAGNOSIS — H81.10 BENIGN PAROXYSMAL POSITIONAL VERTIGO, UNSPECIFIED LATERALITY: ICD-10-CM

## 2018-04-13 DIAGNOSIS — Z00.00 ENCOUNTER FOR ROUTINE ADULT HEALTH EXAMINATION WITHOUT ABNORMAL FINDINGS: Primary | ICD-10-CM

## 2018-04-13 DIAGNOSIS — I10 BENIGN ESSENTIAL HYPERTENSION: ICD-10-CM

## 2018-04-13 DIAGNOSIS — E78.5 HYPERLIPIDEMIA LDL GOAL <130: ICD-10-CM

## 2018-04-13 DIAGNOSIS — K21.9 GASTROESOPHAGEAL REFLUX DISEASE, ESOPHAGITIS PRESENCE NOT SPECIFIED: ICD-10-CM

## 2018-04-13 DIAGNOSIS — N40.0 BENIGN PROSTATIC HYPERPLASIA WITHOUT LOWER URINARY TRACT SYMPTOMS: ICD-10-CM

## 2018-04-13 DIAGNOSIS — R68.82 LOW LIBIDO: ICD-10-CM

## 2018-04-13 DIAGNOSIS — T75.3XXD MOTION SICKNESS, SUBSEQUENT ENCOUNTER: ICD-10-CM

## 2018-04-13 DIAGNOSIS — T75.3XXA MOTION SICKNESS, INITIAL ENCOUNTER: ICD-10-CM

## 2018-04-13 DIAGNOSIS — Z13.1 SCREENING FOR DIABETES MELLITUS: ICD-10-CM

## 2018-04-13 PROBLEM — N40.1 BENIGN NON-NODULAR PROSTATIC HYPERPLASIA WITH LOWER URINARY TRACT SYMPTOMS: Status: RESOLVED | Noted: 2017-02-09 | Resolved: 2018-04-13

## 2018-04-13 LAB
ANION GAP SERPL CALCULATED.3IONS-SCNC: 9 MMOL/L (ref 3–14)
BUN SERPL-MCNC: 23 MG/DL (ref 7–30)
CALCIUM SERPL-MCNC: 8.7 MG/DL (ref 8.5–10.1)
CHLORIDE SERPL-SCNC: 105 MMOL/L (ref 94–109)
CHOLEST SERPL-MCNC: 246 MG/DL
CO2 SERPL-SCNC: 26 MMOL/L (ref 20–32)
CREAT SERPL-MCNC: 0.83 MG/DL (ref 0.66–1.25)
FSH SERPL-ACNC: 7.7 IU/L (ref 0.7–10.8)
GFR SERPL CREATININE-BSD FRML MDRD: >90 ML/MIN/1.7M2
GLUCOSE SERPL-MCNC: 114 MG/DL (ref 70–99)
HDLC SERPL-MCNC: 50 MG/DL
LDLC SERPL CALC-MCNC: 153 MG/DL
LH SERPL-ACNC: 3.7 IU/L (ref 1.5–9.3)
NONHDLC SERPL-MCNC: 196 MG/DL
POTASSIUM SERPL-SCNC: 4.5 MMOL/L (ref 3.4–5.3)
PROLACTIN SERPL-MCNC: 5 UG/L (ref 2–18)
SODIUM SERPL-SCNC: 140 MMOL/L (ref 133–144)
TRIGL SERPL-MCNC: 215 MG/DL
TSH SERPL DL<=0.005 MIU/L-ACNC: 2.56 MU/L (ref 0.4–4)

## 2018-04-13 PROCEDURE — 80061 LIPID PANEL: CPT | Performed by: PHYSICIAN ASSISTANT

## 2018-04-13 PROCEDURE — 84146 ASSAY OF PROLACTIN: CPT | Performed by: PHYSICIAN ASSISTANT

## 2018-04-13 PROCEDURE — 84443 ASSAY THYROID STIM HORMONE: CPT | Performed by: PHYSICIAN ASSISTANT

## 2018-04-13 PROCEDURE — 80048 BASIC METABOLIC PNL TOTAL CA: CPT | Performed by: PHYSICIAN ASSISTANT

## 2018-04-13 PROCEDURE — 99396 PREV VISIT EST AGE 40-64: CPT | Performed by: PHYSICIAN ASSISTANT

## 2018-04-13 PROCEDURE — 84403 ASSAY OF TOTAL TESTOSTERONE: CPT | Performed by: PHYSICIAN ASSISTANT

## 2018-04-13 PROCEDURE — 83002 ASSAY OF GONADOTROPIN (LH): CPT | Performed by: PHYSICIAN ASSISTANT

## 2018-04-13 PROCEDURE — 99213 OFFICE O/P EST LOW 20 MIN: CPT | Mod: 25 | Performed by: PHYSICIAN ASSISTANT

## 2018-04-13 PROCEDURE — 83001 ASSAY OF GONADOTROPIN (FSH): CPT | Performed by: PHYSICIAN ASSISTANT

## 2018-04-13 PROCEDURE — 36415 COLL VENOUS BLD VENIPUNCTURE: CPT | Performed by: PHYSICIAN ASSISTANT

## 2018-04-13 RX ORDER — HYDROCHLOROTHIAZIDE 25 MG/1
25 TABLET ORAL DAILY
Qty: 30 TABLET | Refills: 5 | Status: SHIPPED | OUTPATIENT
Start: 2018-04-13 | End: 2018-05-29

## 2018-04-13 RX ORDER — ATORVASTATIN CALCIUM 10 MG/1
10 TABLET, FILM COATED ORAL DAILY
Qty: 90 TABLET | Refills: 3 | Status: SHIPPED | OUTPATIENT
Start: 2018-04-13 | End: 2019-05-29

## 2018-04-13 ASSESSMENT — ENCOUNTER SYMPTOMS
HEMATURIA: 0
COUGH: 0
HEARTBURN: 1
ABDOMINAL PAIN: 0
FREQUENCY: 0
DIZZINESS: 0
NERVOUS/ANXIOUS: 0
MYALGIAS: 1
DIARRHEA: 0
WEAKNESS: 0
HEMATOCHEZIA: 0
CHILLS: 0
SHORTNESS OF BREATH: 0
FEVER: 0
JOINT SWELLING: 0
PALPITATIONS: 0
PARESTHESIAS: 0
DYSURIA: 0
SORE THROAT: 0
NAUSEA: 0
HEADACHES: 0
ARTHRALGIAS: 0
EYE PAIN: 0
CONSTIPATION: 0

## 2018-04-13 ASSESSMENT — PAIN SCALES - GENERAL: PAINLEVEL: NO PAIN (0)

## 2018-04-13 NOTE — NURSING NOTE
"Chief Complaint   Patient presents with     Physical     Panel Management     height, honoring choices, lipid       Initial /90 (BP Location: Right arm, Patient Position: Chair, Cuff Size: Adult Regular)  Pulse 54  Temp 97.7  F (36.5  C) (Temporal)  Resp 16  Ht 5' 3\" (1.6 m)  Wt 172 lb (78 kg)  SpO2 98%  BMI 30.47 kg/m2 Estimated body mass index is 30.47 kg/(m^2) as calculated from the following:    Height as of this encounter: 5' 3\" (1.6 m).    Weight as of this encounter: 172 lb (78 kg).  Medication Reconciliation: complete     Naomi Retana CMA      "

## 2018-04-13 NOTE — MR AVS SNAPSHOT
After Visit Summary   4/13/2018    Jorge Jeffers    MRN: [id number]           Patient Information     Date Of Birth          1960        Visit Information        Provider Department      4/13/2018 8:00 AM Carlos Joseph PA-C [facility]        Today's Diagnoses     Encounter for routine adult health examination without abnormal findings    -  1    Benign essential hypertension        Hyperlipidemia LDL goal <130        Gastroesophageal reflux disease, esophagitis presence not specified        Benign non-nodular prostatic hyperplasia with lower urinary tract symptoms        Motion sickness, subsequent encounter        Benign paroxysmal positional vertigo, unspecified laterality        Motion sickness, initial encounter        Screening for diabetes mellitus          Care Instructions      Preventive Health Recommendations  Male Ages 50 - 64    Yearly exam:             See your health care provider every year in order to  o   Review health changes.   o   Discuss preventive care.    o   Review your medicines if your doctor has prescribed any.     Have a cholesterol test every 5 years, or more frequently if you are at risk for high cholesterol/heart disease.     Have a diabetes test (fasting glucose) every three years. If you are at risk for diabetes, you should have this test more often.     Have a colonoscopy at age 50, or have a yearly FIT test (stool test). These exams will check for colon cancer.      Talk with your health care provider about whether or not a prostate cancer screening test (PSA) is right for you.    You should be tested each year for STDs (sexually transmitted diseases), if you re at risk.     Shots: Get a flu shot each year. Get a tetanus shot every 10 years.     Nutrition:    Eat at least 5 servings of fruits and vegetables daily.     Eat whole-grain bread, whole-wheat pasta and brown rice instead of white grains and rice.     Talk to your provider  about Calcium and Vitamin D.     Lifestyle    Exercise for at least 150 minutes a week (30 minutes a day, 5 days a week). This will help you control your weight and prevent disease.     Limit alcohol to one drink per day.     No smoking.     Wear sunscreen to prevent skin cancer.     See your dentist every six months for an exam and cleaning.     See your eye doctor every 1 to 2 years.    Will start you on a medication called hydrochlorithiazide to take daily in the mornings to help with your blood pressure.  Follow up weekly with the nurses for BP checks for the next month.  Try to cut back on the salt in your diet and continue with routine exercise.  Consider buying a home arm cuff to check your blood pressure. We would like you consistently below 140/90.    I recommend you continue with meclizine as needed for the motion sickness, dizziness. I also recommend the modified Epley maneuver at home 2-3 times daily a few days before going on a fishing trip or traveling in a car for a long period of time.  If not improving, let me know.    Continue with omeprazole a few times weekly as needed for heartburn. Try to avoid greasy, fatty, spicy foods and cut back on the alcohol and coffee.    Follow up in 1 month with updated labs.    Follow up annually as long as blood pressure is improving.              Follow-ups after your visit        Follow-up notes from your care team     Return in about 1 year (around 4/13/2019) for Routine Visit, Lab Work, Physical Exam.      Your next 10 appointments already scheduled     Apr 19, 2018  2:30 PM CDT   Nurse Only with NL ANDREW TEAM B, [facility] ([facility]    [address] [address]   [phone]            Apr 26, 2018  2:30 PM CDT   Nurse Only with NL RAIAT TEAM B, [facility] ([facility])    [address] [address]   [phone]            May 03, 2018   2:30 PM CDT   Nurse Only with NL FLOAT TEAM B, [facility]    [address]  [address]  [place] [id number] [phone]            May 10, 2018  2:30 PM CDT   Nurse Only with NL FLOAT TEAM B, [facility]   [facility]    [address] [facility] [address]  [place] [address] [phone]              Future tests that were ordered for you today     Open Future Orders        Priority Expected Expires Ordered    Basic metabolic panel  (Ca, Cl, CO2, Creat, Gluc, K, Na, BUN) Routine 5/11/2018 6/11/2018 4/13/2018            Who to contact     If you have questions or need follow up information about today's clinic visit or your schedule please contact [facility] directly at [phone].  Normal or non-critical lab and imaging results will be communicated to you by [employer], letter or phone within 4 business days after the clinic has received the results. If you do not hear from us within 7 days, please contact the clinic through [employer] or phone. If you have a critical or abnormal lab result, we will notify you by phone as soon as possible.  Submit refill requests through [employer] or call your pharmacy and they will forward the refill request to us. Please allow 3 business days for your refill to be completed.          Additional Information About Your Visit        [employer] Information     [employer] gives you secure access to your electronic health record. If you see a primary care provider, you can also send messages to your care team and make appointments. If you have questions, please call your primary care clinic.  If you do not have a primary care provider, please call [phone] and they will assist you.        Care EveryWhere ID     This is your Care EveryWhere ID. This could be used by other organizations to access your [facility] medical records  [id number]        Your Vitals Were     Pulse Temperature  "Respirations Height Pulse Oximetry BMI (Body Mass Index)    54 97.7  F (36.5  C) (Temporal) 16 5' 3\" (1.6 m) 98% 30.47 kg/m2       Blood Pressure from Last 3 Encounters:   04/13/18 140/90   04/06/18 176/85   04/03/18 182/88    Weight from Last 3 Encounters:   04/13/18 172 lb (78 kg)   02/16/17 172 lb (78 kg)   02/09/17 174 lb (78.9 kg)              We Performed the Following     TSH with free T4 reflex          Today's Medication Changes          These changes are accurate as of 4/13/18  8:50 AM.  If you have any questions, ask your nurse or doctor.               Start taking these medicines.        Dose/Directions    hydrochlorothiazide 25 MG tablet   Commonly known as:  HYDRODIURIL   Used for:  Benign essential hypertension   Started by:  Carlos Joseph PA-C        Dose:  25 mg   Take 1 tablet (25 mg) by mouth daily   Quantity:  30 tablet   Refills:  5            Where to get your medicines      These medications were sent to [facility] [address] [place] [address] [facility] AT [facility] [address]  [address] [facility] [address]     Phone:  [phone]     hydrochlorothiazide 25 MG tablet                Primary Care Provider Office Phone # Fax #    Tami Banuelos [phone]       [address] [place] [address]        Equal Access to Services     [facility]: Hadii kendall lewis Soveronica, waaxda luqadaha, qaybta kaalmada shaan, martha milian. So [facility] [phone].    ATENCIÓN: Si habla español, tiene a barrientos disposición servicios gratuitos de asistencia lingüística. Urszula al [phone].    We comply with applicable federal civil rights laws and [place] laws. We do not discriminate on the basis of race, color, national origin, age, disability, sex, sexual orientation, or gender identity.            Thank you!     Thank you for choosing [facility]  for your care. Our goal is always to provide you with " excellent care. Hearing back from our patients is one way we can continue to improve our services. Please take a few minutes to complete the written survey that you may receive in the mail after your visit with us. Thank you!             Your Updated Medication List - Protect others around you: Learn how to safely use, store and throw away your medicines at www.disposemymeds.org.          This list is accurate as of 4/13/18  8:50 AM.  Always use your most recent med list.                   Brand Name Dispense Instructions for use Diagnosis    hydrochlorothiazide 25 MG tablet    HYDRODIURIL    30 tablet    Take 1 tablet (25 mg) by mouth daily    Benign essential hypertension       OMEPRAZOLE PO

## 2018-04-17 LAB — TESTOST SERPL-MCNC: 471 NG/DL (ref 240–950)

## 2018-04-18 ENCOUNTER — TELEPHONE (OUTPATIENT)
Dept: UROLOGY | Facility: OTHER | Age: 58
End: 2018-04-18

## 2018-04-19 ENCOUNTER — ALLIED HEALTH/NURSE VISIT (OUTPATIENT)
Dept: FAMILY MEDICINE | Facility: OTHER | Age: 58
End: 2018-04-19
Payer: COMMERCIAL

## 2018-04-19 VITALS — DIASTOLIC BLOOD PRESSURE: 77 MMHG | SYSTOLIC BLOOD PRESSURE: 138 MMHG | HEART RATE: 59 BPM

## 2018-04-19 DIAGNOSIS — I10 BENIGN ESSENTIAL HYPERTENSION: Primary | ICD-10-CM

## 2018-04-19 PROCEDURE — 99207 ZZC NO CHARGE NURSE ONLY: CPT

## 2018-04-19 NOTE — MR AVS SNAPSHOT
After Visit Summary   4/19/2018    Jorge Jeffers    MRN: [id number]           Patient Information     Date Of Birth          1960        Visit Information        Provider Department      4/19/2018 2:30 PM NL FLOAT TEAM B, [facility]        Today's Diagnoses     Benign essential hypertension    -  1       Follow-ups after your visit        Your next 10 appointments already scheduled     Apr 26, 2018  2:30 PM CDT   Nurse Only with NL FLOAT TEAM B, [facility] ([facility])    [address] [address]   [phone]            May 03, 2018  2:30 PM CDT   Nurse Only with NL FLOAT TEAM B, [facility] ([facility])    [address] [address]   [phone]            May 10, 2018  2:30 PM CDT   Nurse Only with NL FLOAT TEAM B, [facility] ([facility])    [address] [address]   [phone]            May 14, 2018  2:30 PM CDT   LAB with NL LAB [facility] ([facility])    [address] [facility] [address]   [phone]           Please do not eat 10-12 hours before your appointment if you are coming in fasting for labs on lipids, cholesterol, or glucose (sugar). This does not apply to pregnant women. Water, hot tea and black coffee (with nothing added) are okay. Do not drink other fluids, diet soda or chew gum.              Who to contact     If you have questions or need follow up information about today's clinic visit or your schedule please contact [facility] directly at [phone].  Normal or non-critical lab and imaging results will be communicated to you by MyChart, letter or phone within 4 business days after the clinic has received the results. If you do not hear from us within 7 days, please contact the clinic through [employer] or  phone. If you have a critical or abnormal lab result, we will notify you by phone as soon as possible.  Submit refill requests through Curse or call your pharmacy and they will forward the refill request to us. Please allow 3 business days for your refill to be completed.          Additional Information About Your Visit        Adduplexhart Information     Curse gives you secure access to your electronic health record. If you see a primary care provider, you can also send messages to your care team and make appointments. If you have questions, please call your primary care clinic.  If you do not have a primary care provider, please call [phone] and they will assist you.        Care EveryWhere ID     This is your Care EveryWhere ID. This could be used by other organizations to access your [place] medical records  [id number]        Your Vitals Were     Pulse                   59            Blood Pressure from Last 3 Encounters:   04/19/18 138/77   04/13/18 140/90   04/06/18 176/85    Weight from Last 3 Encounters:   04/13/18 172 lb (78 kg)   02/16/17 172 lb (78 kg)   02/09/17 174 lb (78.9 kg)              Today, you had the following     No orders found for display       Primary Care Provider Office Phone # Fax #    Carlos Joseph PA-C [phone]       [address] [place] [address]        Equal Access to Services     JUANITA MEEKS : Hadii kendall ku hadasho Soomaali, waaxda luqadaha, qaybta kaalmada adeegyada, martha horan . So [facility] [phone].    ATENCIÓN: Si habla español, tiene a barrientos disposición servicios gratuitos de asistencia lingüística. Llame al [phone].    We comply with applicable federal civil rights laws and [place] laws. We do not discriminate on the basis of race, color, national origin, age, disability, sex, sexual orientation, or gender identity.            Thank you!     Thank you for choosing [facility]  for your  care. Our goal is always to provide you with excellent care. Hearing back from our patients is one way we can continue to improve our services. Please take a few minutes to complete the written survey that you may receive in the mail after your visit with us. Thank you!             Your Updated Medication List - Protect others around you: Learn how to safely use, store and throw away your medicines at www.disposemymeds.org.          This list is accurate as of 4/19/18  2:41 PM.  Always use your most recent med list.                   Brand Name Dispense Instructions for use Diagnosis    atorvastatin 10 MG tablet    LIPITOR    90 tablet    Take 1 tablet (10 mg) by mouth daily    Hyperlipidemia LDL goal <130       hydrochlorothiazide 25 MG tablet    HYDRODIURIL    30 tablet    Take 1 tablet (25 mg) by mouth daily    Benign essential hypertension       OMEPRAZOLE PO

## 2018-04-19 NOTE — NURSING NOTE
Jorge Jeffers is a 57 year old male who comes in today for a Blood Pressure check because of new medication.    *Document pulse and BP  *Use new set of vitals button for multiple readings.  *Use extended vitals for orthostatic    Vitals as recorded, a large cuff was used.    Patient is taking medication as prescribed  Patient is tolerating medications well.  Patient is not monitoring Blood Pressure at home.  Average readings if yes are NA    Current complaints: none    Disposition: results routed to MD/AP

## 2018-04-26 ENCOUNTER — ALLIED HEALTH/NURSE VISIT (OUTPATIENT)
Dept: FAMILY MEDICINE | Facility: OTHER | Age: 58
End: 2018-04-26
Payer: COMMERCIAL

## 2018-04-26 VITALS — SYSTOLIC BLOOD PRESSURE: 130 MMHG | DIASTOLIC BLOOD PRESSURE: 82 MMHG | HEART RATE: 64 BPM

## 2018-04-26 DIAGNOSIS — Z01.30 BP CHECK: Primary | ICD-10-CM

## 2018-04-26 PROCEDURE — 99207 ZZC NO CHARGE NURSE ONLY: CPT

## 2018-04-26 NOTE — PROGRESS NOTES
Jorge Jeffers is a 57 year old male who comes in today for a Blood Pressure check because of ongoing blood pressure monitoring.    *Document pulse and BP  *Use new set of vitals button for multiple readings.  *Use extended vitals for orthostatic    Vitals as recorded, a regular cuff was used.    Patient is taking medication as prescribed  Patient is tolerating medications well.  Patient is not monitoring Blood Pressure at home.  Average readings if yes are NA    Current complaints: none    Disposition: follow-up as indicated by MD/AP. Has another blood pressure check next week.  Naomi Retana, CMA

## 2018-04-26 NOTE — MR AVS SNAPSHOT
After Visit Summary   4/26/2018    Jorge Jeffers    MRN: [id number]           Patient Information     Date Of Birth          1960        Visit Information        Provider Department      4/26/2018 2:30 PM NL FLOAT TEAM B, [facility]        Today's Diagnoses     BP check    -  1       Follow-ups after your visit        Your next 10 appointments already scheduled     May 03, 2018  2:30 PM CDT   Nurse Only with NL FLOAT TEAM B, [facility] ([facility])    [address] [address]   [phone]            May 10, 2018  2:30 PM CDT   Nurse Only with NL FLOAT TEAM B, [facility] ([facility])    [address] [address] [phone]            May 14, 2018  2:30 PM CDT   LAB with NL LAB [facility] ([facility])    [address] [facility] [address] [phone]           Please do not eat 10-12 hours before your appointment if you are coming in fasting for labs on lipids, cholesterol, or glucose (sugar). This does not apply to pregnant women. Water, hot tea and black coffee (with nothing added) are okay. Do not drink other fluids, diet soda or chew gum.              Who to contact     If you have questions or need follow up information about today's clinic visit or your schedule please contact [facility] directly at [phone].  Normal or non-critical lab and imaging results will be communicated to you by Shopographyhart, letter or phone within 4 business days after the clinic has received the results. If you do not hear from us within 7 days, please contact the clinic through [employer] 911t or phone. If you have a critical or abnormal lab result, we will notify you by phone as soon as possible.  Submit refill requests through Q-Sensei or call your pharmacy and they will forward the refill request to us. Please  allow 3 business days for your refill to be completed.          Additional Information About Your Visit        MyChart Information     uPartshart gives you secure access to your electronic health record. If you see a primary care provider, you can also send messages to your care team and make appointments. If you have questions, please call your primary care clinic.  If you do not have a primary care provider, please call [phone] and they will assist you.        Care EveryWhere ID     This is your Care EveryWhere ID. This could be used by other organizations to access your [place] medical records  [id number]        Your Vitals Were     Pulse                   64            Blood Pressure from Last 3 Encounters:   04/26/18 130/82   04/19/18 138/77   04/13/18 140/90    Weight from Last 3 Encounters:   04/13/18 172 lb (78 kg)   02/16/17 172 lb (78 kg)   02/09/17 174 lb (78.9 kg)              Today, you had the following     No orders found for display       Primary Care Provider Office Phone # Fax #    Carlos Joseph PA-C [phone]       [address] [place] [address] [address]  [facility] [address]        Equal Access to Services     [facility]: Hadii aad ku hadasho Soomaali, waaxda luqadaha, qaybta kaalmada adejamesyada, martha horan . So [facility] [phone].    ATENCIÓN: Si habla español, tiene a barrientos disposición servicios gratuitos de asistencia lingüística. [facility] [phone].    We comply with applicable federal civil rights laws and [place] laws. We do not discriminate on the basis of race, color, national origin, age, disability, sex, sexual orientation, or gender identity.            Thank you!     Thank you for choosing [facility]  for your care. Our goal is always to provide you with excellent care. Hearing back from our patients is one way we can continue to improve our services. Please take a few minutes to complete the written survey that you may receive  in the mail after your visit with us. Thank you!             Your Updated Medication List - Protect others around you: Learn how to safely use, store and throw away your medicines at www.disposemymeds.org.          This list is accurate as of 4/26/18  2:40 PM.  Always use your most recent med list.                   Brand Name Dispense Instructions for use Diagnosis    atorvastatin 10 MG tablet    LIPITOR    90 tablet    Take 1 tablet (10 mg) by mouth daily    Hyperlipidemia LDL goal <130       hydrochlorothiazide 25 MG tablet    HYDRODIURIL    30 tablet    Take 1 tablet (25 mg) by mouth daily    Benign essential hypertension       OMEPRAZOLE PO

## 2018-05-03 ENCOUNTER — ALLIED HEALTH/NURSE VISIT (OUTPATIENT)
Dept: FAMILY MEDICINE | Facility: OTHER | Age: 58
End: 2018-05-03
Payer: COMMERCIAL

## 2018-05-03 VITALS — SYSTOLIC BLOOD PRESSURE: 133 MMHG | HEART RATE: 54 BPM | DIASTOLIC BLOOD PRESSURE: 78 MMHG

## 2018-05-03 DIAGNOSIS — Z01.30 BP CHECK: Primary | ICD-10-CM

## 2018-05-03 NOTE — NURSING NOTE
Jorge Jeffers is a 57 year old patient who comes in today for a Blood Pressure check.  Initial BP:  /78  Pulse 54     54  Disposition: follow-up as previously indicated by provider

## 2018-05-03 NOTE — MR AVS SNAPSHOT
After Visit Summary   5/3/2018    Jorge Jeffers    MRN: [id number]           Patient Information     Date Of Birth          1960        Visit Information        Provider Department      5/3/2018 2:30 PM NL FLOAT TEAM B, [facility]        Today's Diagnoses     BP check    -  1       Follow-ups after your visit        Your next 10 appointments already scheduled     May 10, 2018  2:30 PM CDT   Nurse Only with NL FLOAT TEAM B, [facility] ([facility])    [address] [facility] [address]  [facility] [address] [phone]            May 14, 2018  2:30 PM CDT   LAB with NL LAB [facility] ([facility])    [address] [facility] [address] [phone]           Please do not eat 10-12 hours before your appointment if you are coming in fasting for labs on lipids, cholesterol, or glucose (sugar). This does not apply to pregnant women. Water, hot tea and black coffee (with nothing added) are okay. Do not drink other fluids, diet soda or chew gum.              Who to contact     If you have questions or need follow up information about today's clinic visit or your schedule please contact [facility] directly at [phone].  Normal or non-critical lab and imaging results will be communicated to you by Readbughart, letter or phone within 4 business days after the clinic has received the results. If you do not hear from us within 7 days, please contact the clinic through Readbughart or phone. If you have a critical or abnormal lab result, we will notify you by phone as soon as possible.  Submit refill requests through Additech or call your pharmacy and they will forward the refill request to us. Please allow 3 business days for your refill to be completed.          Additional Information About Your Visit        ReadbugharKidzloop Information     Additech gives you secure access to your electronic health record. If  you see a primary care provider, you can also send messages to your care team and make appointments. If you have questions, please call your primary care clinic.  If you do not have a primary care provider, please call [phone] and they will assist you.        Care EveryWhere ID     This is your Care EveryWhere ID. This could be used by other organizations to access your [place] medical records  [id number]        Your Vitals Were     Pulse                   54            Blood Pressure from Last 3 Encounters:   05/03/18 133/78   04/26/18 130/82   04/19/18 138/77    Weight from Last 3 Encounters:   04/13/18 172 lb (78 kg)   02/16/17 172 lb (78 kg)   02/09/17 174 lb (78.9 kg)              Today, you had the following     No orders found for display       Primary Care Provider Office Phone # Fax #    Carlos Joseph PA-C [phone]       [address] [facility] [address]  [facility] [address]        Equal Access to Services     JUANITA MEEKS : Hadii aad ku hadasho Soomaali, waaxda luqadaha, qaybta kaalmada adeegyada, waxay idiin hayden shawn horan . So [facility] [phone].    ATENCIÓN: Si habla español, tiene a barrientos disposición servicios gratuitos de asistencia lingüística. [facility] [phone].    We comply with applicable federal civil rights laws and [place] laws. We do not discriminate on the basis of race, color, national origin, age, disability, sex, sexual orientation, or gender identity.            Thank you!     Thank you for choosing [facility]  for your care. Our goal is always to provide you with excellent care. Hearing back from our patients is one way we can continue to improve our services. Please take a few minutes to complete the written survey that you may receive in the mail after your visit with us. Thank you!             Your Updated Medication List - Protect others around you: Learn how to safely use, store and throw away your medicines at  www.disposemymeds.org.          This list is accurate as of 5/3/18  2:39 PM.  Always use your most recent med list.                   Brand Name Dispense Instructions for use Diagnosis    atorvastatin 10 MG tablet    LIPITOR    90 tablet    Take 1 tablet (10 mg) by mouth daily    Hyperlipidemia LDL goal <130       hydrochlorothiazide 25 MG tablet    HYDRODIURIL    30 tablet    Take 1 tablet (25 mg) by mouth daily    Benign essential hypertension       OMEPRAZOLE PO

## 2018-05-10 ENCOUNTER — ALLIED HEALTH/NURSE VISIT (OUTPATIENT)
Dept: FAMILY MEDICINE | Facility: OTHER | Age: 58
End: 2018-05-10
Payer: COMMERCIAL

## 2018-05-10 ENCOUNTER — TELEPHONE (OUTPATIENT)
Dept: FAMILY MEDICINE | Facility: OTHER | Age: 58
End: 2018-05-10

## 2018-05-10 VITALS — HEART RATE: 56 BPM | SYSTOLIC BLOOD PRESSURE: 134 MMHG | DIASTOLIC BLOOD PRESSURE: 72 MMHG

## 2018-05-10 DIAGNOSIS — Z01.30 BP CHECK: Primary | ICD-10-CM

## 2018-05-10 DIAGNOSIS — T75.3XXA MOTION SICKNESS, INITIAL ENCOUNTER: ICD-10-CM

## 2018-05-10 RX ORDER — SCOLOPAMINE TRANSDERMAL SYSTEM 1 MG/1
PATCH, EXTENDED RELEASE TRANSDERMAL
Qty: 4 PATCH | Refills: 1 | Status: SHIPPED | OUTPATIENT
Start: 2018-05-10 | End: 2018-10-24

## 2018-05-10 NOTE — MR AVS SNAPSHOT
After Visit Summary   5/10/2018    Jorge Jeffers    MRN: [id number]           Patient Information     Date Of Birth          1960        Visit Information        Provider Department      5/10/2018 2:30 PM NL [place] TEAM B, [facility]        Today's Diagnoses     BP check    -  1       Follow-ups after your visit        Your next 10 appointments already scheduled     May 14, 2018  2:30 PM CDT   LAB with NL LAB [facility] ([facility])    [address] [facility] [id number] [phone]           Please do not eat 10-12 hours before your appointment if you are coming in fasting for labs on lipids, cholesterol, or glucose (sugar). This does not apply to pregnant women. Water, hot tea and black coffee (with nothing added) are okay. Do not drink other fluids, diet soda or chew gum.              Who to contact     If you have questions or need follow up information about today's clinic visit or your schedule please contact [facility] directly at [phone].  Normal or non-critical lab and imaging results will be communicated to you by Receptoshart, letter or phone within 4 business days after the clinic has received the results. If you do not hear from us within 7 days, please contact the clinic through [employer] or phone. If you have a critical or abnormal lab result, we will notify you by phone as soon as possible.  Submit refill requests through classmarkets or call your pharmacy and they will forward the refill request to us. Please allow 3 business days for your refill to be completed.          Additional Information About Your Visit        Receptoshart Information     classmarkets gives you secure access to your electronic health record. If you see a primary care provider, you can also send messages to your care team and make appointments. If you have questions, please call your primary care clinic.  If you do not have a primary care  provider, please call [phone] and they will assist you.        Care EveryWhere ID     This is your Care EveryWhere ID. This could be used by other organizations to access your [place] medical records  [id number]        Your Vitals Were     Pulse                   56            Blood Pressure from Last 3 Encounters:   05/10/18 134/72   05/03/18 133/78   04/26/18 130/82    Weight from Last 3 Encounters:   04/13/18 172 lb (78 kg)   02/16/17 172 lb (78 kg)   02/09/17 174 lb (78.9 kg)              Today, you had the following     No orders found for display       Primary Care Provider Office Phone # Fax #    Carlos Joseph PA-C [phone]       [address] [place] [address]        Equal Access to Services     J[facility]ATHAN [facility] : Hadii aad ku hadasho Soveronica, waaxda luqadaha, qaybta kaalmada adejamesyaphi, martha horan . So [facility] [phone].    ATENCIÓN: Si habla español, tiene a barrientos disposición servicios gratuitos de asistencia lingüística. Urszula al [phone].    We comply with applicable federal civil rights laws and [place] laws. We do not discriminate on the basis of race, color, national origin, age, disability, sex, sexual orientation, or gender identity.            Thank you!     Thank you for choosing [facility]  for your care. Our goal is always to provide you with excellent care. Hearing back from our patients is one way we can continue to improve our services. Please take a few minutes to complete the written survey that you may receive in the mail after your visit with us. Thank you!             Your Updated Medication List - Protect others around you: Learn how to safely use, store and throw away your medicines at www.disposemymeds.org.          This list is accurate as of 5/10/18  2:38 PM.  Always use your most recent med list.                   Brand Name Dispense Instructions for use Diagnosis    atorvastatin 10 MG tablet     LIPITOR    90 tablet    Take 1 tablet (10 mg) by mouth daily    Hyperlipidemia LDL goal <130       hydrochlorothiazide 25 MG tablet    HYDRODIURIL    30 tablet    Take 1 tablet (25 mg) by mouth daily    Benign essential hypertension       OMEPRAZOLE PO

## 2018-05-10 NOTE — TELEPHONE ENCOUNTER
Scopolamine patches      Last Written Prescription Date:  3/9/17  Last Fill Quantity: 4,   # refills: 0  Last Office Visit: 4/13/18  Future Office visit:       Routing refill request to provider for review/approval because:  Drug not active on patient's medication list    Pt states at his last appointment they had discussed this medication but the pt declined as he didn't think he would need this medication.  He is going fishing this weekend and would like this filled.      Gela Fatima, CMA

## 2018-05-10 NOTE — NURSING NOTE
Jorge Jeffers is a 57 year old patient who comes in today for a Blood Pressure check.  Initial BP:  /72  Pulse 56     56  Disposition: results routed to provider

## 2018-05-14 DIAGNOSIS — R73.9 ELEVATED BLOOD SUGAR: Primary | ICD-10-CM

## 2018-05-14 DIAGNOSIS — E78.5 HYPERLIPIDEMIA LDL GOAL <130: ICD-10-CM

## 2018-05-14 DIAGNOSIS — I10 BENIGN ESSENTIAL HYPERTENSION: ICD-10-CM

## 2018-05-14 LAB
ANION GAP SERPL CALCULATED.3IONS-SCNC: 6 MMOL/L (ref 3–14)
BUN SERPL-MCNC: 13 MG/DL (ref 7–30)
CALCIUM SERPL-MCNC: 8.9 MG/DL (ref 8.5–10.1)
CHLORIDE SERPL-SCNC: 102 MMOL/L (ref 94–109)
CO2 SERPL-SCNC: 32 MMOL/L (ref 20–32)
CREAT SERPL-MCNC: 1.03 MG/DL (ref 0.66–1.25)
GFR SERPL CREATININE-BSD FRML MDRD: 74 ML/MIN/1.7M2
GLUCOSE SERPL-MCNC: 130 MG/DL (ref 70–99)
POTASSIUM SERPL-SCNC: 3.8 MMOL/L (ref 3.4–5.3)
SODIUM SERPL-SCNC: 140 MMOL/L (ref 133–144)

## 2018-05-14 PROCEDURE — 36415 COLL VENOUS BLD VENIPUNCTURE: CPT | Performed by: PHYSICIAN ASSISTANT

## 2018-05-14 PROCEDURE — 80048 BASIC METABOLIC PNL TOTAL CA: CPT | Performed by: PHYSICIAN ASSISTANT

## 2018-05-15 ENCOUNTER — TELEPHONE (OUTPATIENT)
Dept: FAMILY MEDICINE | Facility: OTHER | Age: 58
End: 2018-05-15

## 2018-05-15 NOTE — TELEPHONE ENCOUNTER
----- Message from Meeta Rodriguez PA-C sent at 5/15/2018  8:43 AM CDT -----  Please call.  Kidney function is stable.  His Blood sugar remains elevated, when he comes in fasting for his cholesterol I will also add on A1c to see 3 months of his blood sugar to check for signs of prediabetes or diabetes.

## 2018-05-16 NOTE — TELEPHONE ENCOUNTER
Patient was informed of message below. No further questions at time of call.     Venessa Gore MA

## 2018-05-17 ENCOUNTER — TELEPHONE (OUTPATIENT)
Dept: FAMILY MEDICINE | Facility: OTHER | Age: 58
End: 2018-05-17

## 2018-05-17 NOTE — TELEPHONE ENCOUNTER
Pt was in on float schedule for BP check.  Per JM:      BP looks great but he needs to come in for updated BMP. Thanks.     Carlos Joseph PA-C       Pt notified.    Gela Fatima CMA

## 2018-05-29 ENCOUNTER — TELEPHONE (OUTPATIENT)
Dept: LAB | Facility: OTHER | Age: 58
End: 2018-05-29

## 2018-05-29 DIAGNOSIS — E78.5 HYPERLIPIDEMIA LDL GOAL <130: ICD-10-CM

## 2018-05-29 DIAGNOSIS — I10 BENIGN ESSENTIAL HYPERTENSION: ICD-10-CM

## 2018-05-29 LAB
CHOLEST SERPL-MCNC: 171 MG/DL
HDLC SERPL-MCNC: 46 MG/DL
LDLC SERPL CALC-MCNC: 90 MG/DL
NONHDLC SERPL-MCNC: 125 MG/DL
TRIGL SERPL-MCNC: 176 MG/DL

## 2018-05-29 PROCEDURE — 80061 LIPID PANEL: CPT | Performed by: PHYSICIAN ASSISTANT

## 2018-05-29 PROCEDURE — 36415 COLL VENOUS BLD VENIPUNCTURE: CPT | Performed by: PHYSICIAN ASSISTANT

## 2018-05-29 RX ORDER — HYDROCHLOROTHIAZIDE 25 MG/1
25 TABLET ORAL DAILY
Qty: 90 TABLET | Refills: 1 | Status: SHIPPED | OUTPATIENT
Start: 2018-05-29 | End: 2018-10-24

## 2018-05-29 NOTE — TELEPHONE ENCOUNTER
Jorge came into lab and had a question about his blood pressure medication he is taking, he said he has a 30 day prescription when he normally has a 90 day prescription. He is just wondering if someone would be able to call him and let him know why.    Thank you,  Qiana Velázquez MLT   Lab

## 2018-06-05 ENCOUNTER — TRANSFERRED RECORDS (OUTPATIENT)
Dept: HEALTH INFORMATION MANAGEMENT | Facility: CLINIC | Age: 58
End: 2018-06-05

## 2018-06-19 ENCOUNTER — TRANSFERRED RECORDS (OUTPATIENT)
Dept: HEALTH INFORMATION MANAGEMENT | Facility: CLINIC | Age: 58
End: 2018-06-19

## 2018-06-22 NOTE — PROGRESS NOTES
[address]  [facility] [address]  [phone]  Dept: [phone]    PRE-OP EVALUATION:  Today's date: 2018    Jorge Jeffers (: 1960) presents for pre-operative evaluation assessment as requested by Dr. Danny Gore.  He requires evaluation and anesthesia risk assessment prior to undergoing surgery/procedure for treatment of right shoulder rotator cuff tear.    Fax number for surgical facility: [facility]: [phone]  Primary Physician: Carlos Joseph  Type of Anesthesia Anticipated: General    Patient has a Health Care Directive or Living Will:  NO    Preop Questions 2018   Who is doing your surgery? [facility]-Dr. Danny Gore   What are you having done? Right shoulder arthroscopic subacromial decompression   Date of Surgery/Procedure: 18   1.  Do you have a history of Heart attack, stroke, stent, coronary bypass surgery, or other heart surgery? No   2.  Do you ever have any pain or discomfort in your chest? YES - occasional musculoskeletal pain in left chest at rest on occasion (chronic), no chest pain with exertion   3.  Do you have a history of  Heart Failure? No   4.   Are you troubled by shortness of breath when:  walking on a level surface, or up a slight hill, or at night? No   5.  Do you currently have a cold, bronchitis or other respiratory infection? No   6.  Do you have a cough, shortness of breath, or wheezing? No   7.  Do you sometimes get pains in the calves of your legs when you walk? No   8. Do you or anyone in your family have previous history of blood clots? No   9.  Do you or does anyone in your family have a serious bleeding problem such as prolonged bleeding following surgeries or cuts? No   10. Have you ever had problems with anemia or been told to take iron pills? No   11. Have you had any abnormal blood loss such as black, tarry or bloody stools? No   12. Have you ever had a blood transfusion? No    13. Have you or any of your relatives ever had problems with anesthesia? YES - nauseated   14. Do you have sleep apnea, excessive snoring or daytime drowsiness? UNKNOWN - wife states he snores loudly at times with occasional possible apneic episodes but does not occur often   15. Do you have any prosthetic heart valves? No   16. Do you have prosthetic joints? No         HPI:     HPI related to upcoming procedure: Patient has experienced right shoulder pain for a few years and was recently found to have a partial right rotatator cuff tear so will be undergoing ASD with repair at [facility] with Dr. Gore on 7/11/18.     Occasional snoring and wife has told him his breathing is sometimes different than normal but this is rare. He has never had a sleep study. He denies daytime fatigue, chest pain, or shortness of breath.    HYPERLIPIDEMIA - Patient has ahistory of significant Hyperlipidemia requiring medication for treatment with recent good control. Patient reports no problems or side effects with the medication.                                                                                                                                                         HYPERTENSION - Patient has longstanding history of HTN, currently denies any symptoms referable to elevated blood pressure. Specifically denies chest pain, palpitations, dyspnea, orthopnea, PND or peripheral edema. Blood pressure readings have been in normal range. Current medication regimen is as listed below. Patient denies any side effects of medication.                                                                                                                                                                                              MEDICAL HISTORY:     Patient Active Problem List    Diagnosis Date Noted     Benign essential hypertension 04/13/2018     Priority: Medium     Benign prostatic hyperplasia without lower urinary tract  symptoms 04/13/2018     Priority: Medium     Benign paroxysmal positional vertigo, unspecified laterality 04/13/2018     Priority: Medium     Hyperlipidemia LDL goal <130 01/17/2017     Priority: Medium     Motion sickness, initial encounter 09/04/2015     Priority: Medium     CARDIOVASCULAR SCREENING; LDL GOAL LESS THAN 160 08/15/2014     Priority: Medium     Esophageal reflux 05/25/2007     Priority: Medium      History reviewed. No pertinent past medical history.  Past Surgical History:   Procedure Laterality Date     COLONOSCOPY N/A 3/28/2016    Procedure: COLONOSCOPY;  Surgeon: Tammi Mccoy MD;  Location: [facility] OR     COLONOSCOPY N/A 3/28/2016    Procedure: COMBINED COLONOSCOPY, SINGLE OR MULTIPLE BIOPSY/POLYPECTOMY BY BIOPSY;  Surgeon: Tammi Mccoy MD;  Location: [facility] OR     COLONOSCOPY WITH CO2 INSUFFLATION N/A 3/28/2016    Procedure: COLONOSCOPY WITH CO2 INSUFFLATION;  Surgeon: Tammi Mccoy MD;  Location: [facility] OR     Current Outpatient Prescriptions   Medication Sig Dispense Refill     atorvastatin (LIPITOR) 10 MG tablet Take 1 tablet (10 mg) by mouth daily 90 tablet 3     hydrochlorothiazide (HYDRODIURIL) 25 MG tablet Take 1 tablet (25 mg) by mouth daily 90 tablet 1     OMEPRAZOLE PO        scopolamine (TRANSDERM) 72 hr patch Apply 1 patch to hairless area behind one ear at least 4 hours before travel.  Remove old patch and change every 3 days (72 hours). 4 patch 1     OTC products: no recent use of OTC ASA, NSAIDS or Steroids    Allergies   Allergen Reactions     No Known Drug Allergies       Latex Allergy: NO    Social History   Substance Use Topics     Smoking status: Former Smoker     Types: Cigars     Smokeless tobacco: Never Used      Comment: occasioanlly     Alcohol use Yes      Comment: 1 day a week     History   Drug Use No       REVIEW OF SYSTEMS:   CONSTITUTIONAL: NEGATIVE for fever, chills, change in weight  INTEGUMENTARY/SKIN: NEGATIVE for worrisome rashes, moles or  lesions  EYES: NEGATIVE for vision changes or irritation  ENT/MOUTH: NEGATIVE for ear, mouth and throat problems  RESP: NEGATIVE for significant cough or SOB  CV: NEGATIVE for chest pain, palpitations or peripheral edema  GI: NEGATIVE for nausea, abdominal pain, heartburn, or change in bowel habits  : NEGATIVE for frequency, dysuria, or hematuria  MUSCULOSKELETAL: +Right shoulder pain.  NEURO: NEGATIVE for weakness, dizziness or paresthesias  ENDOCRINE: NEGATIVE for temperature intolerance, skin/hair changes  HEME: NEGATIVE for bleeding problems  PSYCHIATRIC: NEGATIVE for changes in mood or affect    EXAM:   /84 (BP Location: Right arm, Patient Position: Chair, Cuff Size: Adult Regular)  Pulse 64  Temp 98.1  F (36.7  C) (Temporal)  Resp 16  Wt 167 lb (75.8 kg)  SpO2 98%  BMI 29.58 kg/m2    GENERAL APPEARANCE: healthy, alert and no distress     EYES: EOMI,  PERRL     HENT: ear canals and TM's normal and nose and mouth without ulcers or lesions     NECK: no adenopathy, no asymmetry, masses, or scars and thyroid normal to palpation     RESP: lungs clear to auscultation - no rales, rhonchi or wheezes     CV: regular rate and rhythm, normal S1 S2, no S3 or S4 and no murmur, click or rub     ABDOMEN:  soft, nontender, no HSM or masses and bowel sounds normal     MS: extremities normal- no gross deformities noted, no evidence of inflammation in joints, FROM in all extremities.     SKIN: no suspicious lesions or rashes     NEURO: Normal strength and tone, mentation intact and speech normal. Cranial nerves II-XII are grossly intact. DTRs are 2+/4 throughout and symmetric. Gait is stable.      PSYCH: mentation appears normal. and affect normal/bright     LYMPHATICS: No cervical adenopathy    DIAGNOSTICS:   EKG: appears normal, NSR, normal axis, normal intervals, no acute ST/T changes c/w ischemia, no LVH by voltage criteria, unchanged from previous tracings    Recent Labs   Lab Test  05/14/18   1425  04/13/18    0748   01/17/17   1441   01/28/16   1520   HGB   --    --    --    --    --   14.9   PLT   --    --    --    --    --   297   NA  140  140   --    --    < >  139   POTASSIUM  3.8  4.5   < >   --    < >  5.3   CR  1.03  0.83   --    --    < >  1.03   A1C   --    --    --   5.7   --    --     < > = values in this interval not displayed.      Results for orders placed or performed in visit on 06/27/18   Hemoglobin A1c   Result Value Ref Range    Hemoglobin A1C 5.4 0 - 5.6 %       IMPRESSION:   Reason for surgery/procedure: right rotator cuff tear  Diagnosis/reason for consult: pre-operative clearance    The proposed surgical procedure is considered INTERMEDIATE risk.    REVISED CARDIAC RISK INDEX  The patient has the following serious cardiovascular risks for perioperative complications such as (MI, PE, VFib and 3  AV Block):  No serious cardiac risks  INTERPRETATION: 0 risks: Class I (very low risk - 0.4% complication rate)    The patient has the following additional risks for perioperative complications:  No identified additional risks      ICD-10-CM    1. Preop general physical exam Z01.818 EKG 12-lead complete w/read - Clinics   2. Incomplete tear of right rotator cuff M75.111    3. Benign essential hypertension I10    4. Elevated fasting glucose R73.01 Hemoglobin A1c       A1c ordered due to recently elevated fasting glucose and found to be normal.     I recommend he speak with his wife further about his snoring and possible apneic episodes and how often these occur as he may need to undergo a sleep study but this does not preclude him from surgery so he is cleared but should be monitored closely for sleep desaturations.     RECOMMENDATIONS:       Obstructive Sleep Apnea (or suspected sleep apnea)  Hospital staff are advised to monitor for sleep related oxygen desaturations due to suspicion of FAVIO      --Patient is to take all scheduled medications on the day of surgery EXCEPT for modifications listed  below.    Anticoagulant or Antiplatelet Medication Use  NSAIDS: Ibuprofen (Motrin):  Stop 7 days prior to surgery        APPROVAL GIVEN to proceed with proposed procedure, without further diagnostic evaluation       Signed Electronically by: Carlos Joseph PA-C    Copy of this evaluation report is provided to requesting physician.    [facility] Preop Guidelines    Revised Cardiac Risk Index

## 2018-06-22 NOTE — PATIENT INSTRUCTIONS
Before Your Surgery      Call your surgeon if there is any change in your health. This includes signs of a cold or flu (such as a sore throat, runny nose, cough, rash or fever).    Do not smoke, drink alcohol or take over the counter medicine (unless your surgeon or primary care doctor tells you to) for the 24 hours before and after surgery.    If you take prescribed drugs: Follow your doctor s orders about which medicines to take and which to stop until after surgery.    Eating and drinking prior to surgery: follow the instructions from your surgeon    Take a shower or bath the night before surgery. Use the soap your surgeon gave you to gently clean your skin. If you do not have soap from your surgeon, use your regular soap. Do not shave or scrub the surgery site.  Wear clean pajamas and have clean sheets on your bed.     Talk to your wife about your snoring and episodes of abnormal breathing as you may need a sleep study.

## 2018-06-27 ENCOUNTER — OFFICE VISIT (OUTPATIENT)
Dept: FAMILY MEDICINE | Facility: OTHER | Age: 58
End: 2018-06-27
Payer: COMMERCIAL

## 2018-06-27 VITALS
WEIGHT: 167 LBS | TEMPERATURE: 98.1 F | HEART RATE: 64 BPM | RESPIRATION RATE: 16 BRPM | BODY MASS INDEX: 29.58 KG/M2 | SYSTOLIC BLOOD PRESSURE: 120 MMHG | OXYGEN SATURATION: 98 % | DIASTOLIC BLOOD PRESSURE: 84 MMHG

## 2018-06-27 DIAGNOSIS — Z01.818 PREOP GENERAL PHYSICAL EXAM: Primary | ICD-10-CM

## 2018-06-27 DIAGNOSIS — M75.111 INCOMPLETE TEAR OF RIGHT ROTATOR CUFF: ICD-10-CM

## 2018-06-27 DIAGNOSIS — I10 BENIGN ESSENTIAL HYPERTENSION: ICD-10-CM

## 2018-06-27 DIAGNOSIS — R73.01 ELEVATED FASTING GLUCOSE: ICD-10-CM

## 2018-06-27 LAB — HBA1C MFR BLD: 5.4 % (ref 0–5.6)

## 2018-06-27 PROCEDURE — 93000 ELECTROCARDIOGRAM COMPLETE: CPT | Performed by: PHYSICIAN ASSISTANT

## 2018-06-27 PROCEDURE — 83036 HEMOGLOBIN GLYCOSYLATED A1C: CPT | Performed by: PHYSICIAN ASSISTANT

## 2018-06-27 PROCEDURE — 99214 OFFICE O/P EST MOD 30 MIN: CPT | Performed by: PHYSICIAN ASSISTANT

## 2018-06-27 PROCEDURE — 36415 COLL VENOUS BLD VENIPUNCTURE: CPT | Performed by: PHYSICIAN ASSISTANT

## 2018-06-27 NOTE — MR AVS SNAPSHOT
After Visit Summary   6/27/2018    Jorge Jeffers    MRN: [id number]           Patient Information     Date Of Birth          1960        Visit Information        Provider Department      6/27/2018 3:00 PM Carlos Joseph PA-C [facility]        Today's Diagnoses     Preop general physical exam    -  1    Incomplete tear of right rotator cuff        Benign essential hypertension        Elevated fasting glucose          Care Instructions      Before Your Surgery      Call your surgeon if there is any change in your health. This includes signs of a cold or flu (such as a sore throat, runny nose, cough, rash or fever).    Do not smoke, drink alcohol or take over the counter medicine (unless your surgeon or primary care doctor tells you to) for the 24 hours before and after surgery.    If you take prescribed drugs: Follow your doctor s orders about which medicines to take and which to stop until after surgery.    Eating and drinking prior to surgery: follow the instructions from your surgeon    Take a shower or bath the night before surgery. Use the soap your surgeon gave you to gently clean your skin. If you do not have soap from your surgeon, use your regular soap. Do not shave or scrub the surgery site.  Wear clean pajamas and have clean sheets on your bed.     Talk to your wife about your snoring and episodes of abnormal breathing as you may need a sleep study.              Follow-ups after your visit        Follow-up notes from your care team     Return in about 1 year (around 6/27/2019) for Lab Work, Physical Exam, Routine Visit.      Who to contact     If you have questions or need follow up information about today's clinic visit or your schedule please contact [facility] directly at [phone].  Normal or non-critical lab and imaging results will be communicated to you by MyChart, letter or phone within 4 business days after the clinic has received the  results. If you do not hear from us within 7 days, please contact the clinic through [employer] or phone. If you have a critical or abnormal lab result, we will notify you by phone as soon as possible.  Submit refill requests through [employer] or call your pharmacy and they will forward the refill request to us. Please allow 3 business days for your refill to be completed.          Additional Information About Your Visit        [employer] Information     [employer] gives you secure access to your electronic health record. If you see a primary care provider, you can also send messages to your care team and make appointments. If you have questions, please call your primary care clinic.  If you do not have a primary care provider, please call [phone] and they will assist you.        Care EveryWhere ID     This is your Care EveryWhere ID. This could be used by other organizations to access your [facility] medical records  [id number]        Your Vitals Were     Pulse Temperature Respirations Pulse Oximetry BMI (Body Mass Index)       64 98.1  F (36.7  C) (Temporal) 16 98% 29.58 kg/m2        Blood Pressure from Last 3 Encounters:   06/27/18 120/84   05/10/18 134/72   05/03/18 133/78    Weight from Last 3 Encounters:   06/27/18 167 lb (75.8 kg)   04/13/18 172 lb (78 kg)   02/16/17 172 lb (78 kg)              We Performed the Following     EKG 12-lead complete w/read - Clinics     Hemoglobin A1c        Primary Care Provider Office Phone # Fax #    Carlos Joseph PA-C [phone]       [address] [place] [address]        Equal Access to Services     [facility]: Hadii aad stephen hadasho Soveronica, waaxda luqadaha, qaybta kaalmada martha garduno . So [facility] [phone].    ATENCIÓN: Si habla español, tiene a barrientos disposición servicios gratuitos de asistencia lingüística. Llame al [phone].    We comply with applicable federal civil rights laws and [place] laws.  We do not discriminate on the basis of race, color, national origin, age, disability, sex, sexual orientation, or gender identity.            Thank you!     Thank you for choosing [facility]  for your care. Our goal is always to provide you with excellent care. Hearing back from our patients is one way we can continue to improve our services. Please take a few minutes to complete the written survey that you may receive in the mail after your visit with us. Thank you!             Your Updated Medication List - Protect others around you: Learn how to safely use, store and throw away your medicines at www.disposemymeds.org.          This list is accurate as of 6/27/18  3:32 PM.  Always use your most recent med list.                   Brand Name Dispense Instructions for use Diagnosis    atorvastatin 10 MG tablet    LIPITOR    90 tablet    Take 1 tablet (10 mg) by mouth daily    Hyperlipidemia LDL goal <130       hydrochlorothiazide 25 MG tablet    HYDRODIURIL    90 tablet    Take 1 tablet (25 mg) by mouth daily    Benign essential hypertension       OMEPRAZOLE PO           scopolamine 72 hr patch    TRANSDERM    4 patch    Apply 1 patch to hairless area behind one ear at least 4 hours before travel.  Remove old patch and change every 3 days (72 hours).    Motion sickness, initial encounter

## 2018-06-27 NOTE — NURSING NOTE
"Chief Complaint   Patient presents with     Pre-Op Exam     Panel Management     hiv, honoring choices       Initial /84 (BP Location: Right arm, Patient Position: Chair, Cuff Size: Adult Regular)  Pulse 64  Temp 98.1  F (36.7  C) (Temporal)  Resp 16  Wt 167 lb (75.8 kg)  SpO2 98%  BMI 29.58 kg/m2 Estimated body mass index is 29.58 kg/(m^2) as calculated from the following:    Height as of 4/13/18: 5' 3\" (1.6 m).    Weight as of this encounter: 167 lb (75.8 kg).  Medication Reconciliation: complete    Naomi Retana CMA      "

## 2018-09-18 ENCOUNTER — TRANSFERRED RECORDS (OUTPATIENT)
Dept: HEALTH INFORMATION MANAGEMENT | Facility: CLINIC | Age: 58
End: 2018-09-18

## 2018-09-20 ENCOUNTER — TRANSFERRED RECORDS (OUTPATIENT)
Dept: HEALTH INFORMATION MANAGEMENT | Facility: CLINIC | Age: 58
End: 2018-09-20

## 2018-10-04 ENCOUNTER — TRANSFERRED RECORDS (OUTPATIENT)
Dept: HEALTH INFORMATION MANAGEMENT | Facility: CLINIC | Age: 58
End: 2018-10-04

## 2018-10-19 NOTE — PROGRESS NOTES
SUBJECTIVE:   Jorge Jeffers is a 58 year old male who presents to clinic today for the following health issues:    History of Present Illness     Hyperlipidemia:     Low fat/chol diet rating::  Not monitoring fat    Taking Statins::  YES    Side effects from hypolipidemia medication::  No muscle aches from Statin    Lipid Medications or Supplements::  None    Hypertension:     Outpatient blood pressures:  Are not being checked    Dietary sodium intake::  Not monitoring salt intake    Diet:  Regular (no restrictions)  Frequency of exercise:  2-3 days/week  Duration of exercise:  Greater than 60 minutes  Taking medications regularly:  Yes  Medication side effects:  None  Additional concerns today:  No    He states his blood pressure continues to be higher than normal for him over the past few months. He has been exercising less since his right shoulder surgery in July but does not think it should continue to be this high. 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 He denies any headaches, dizziness, chest pain, or shortness of breath. He does snore at night occasionally and his wife has maybe told him once or twice that he occasionally has abnormal breathing but it is nothing on a consistent basis and he states he sleeps well. He still does not want to set up a sleep study.     Problem list and histories reviewed & adjusted, as indicated.  Additional history: none    ROS:   GENERAL: Denies fever, fatigue, weakness, weight gain, or weight loss.  CARDIOVASCULAR: Denies chest pain, shortness of breath, irregular heartbeats,  palpitations, or edema.  RESPIRATORY: Denies cough, hemoptysis, and shortness of breath.  NEUROLOGIC: Denies headache, fainting, dizziness, memory loss, numbness, tingling, or seizures.    OBJECTIVE:     /82  Pulse 64  Temp 98.4  F (36.9  C) (Temporal)  Resp 16  Wt 175 lb 3.2 oz (79.5 kg)  SpO2 96%  BMI 31.04 kg/m2  Body mass index is 31.04  kg/(m^2).  GENERAL: healthy, alert and no distress  RESP: lungs clear to auscultation - no rales, rhonchi or wheezes  CV: regular rate and rhythm, normal S1 S2, no S3 or S4, no murmur, click or rub, no peripheral edema  NEURO: Normal strength and tone, mentation intact and speech normal    ASSESSMENT/PLAN:       ICD-10-CM    1. Benign essential hypertension I10 lisinopril (PRINIVIL/ZESTRIL) 10 MG tablet     hydrochlorothiazide (HYDRODIURIL) 25 MG tablet     Basic metabolic panel  (Ca, Cl, CO2, Creat, Gluc, K, Na, BUN)   2. Snoring R06.83    3. Need for prophylactic vaccination and inoculation against influenza Z23 FLU VACCINE, (RIV4) RECOMBINANT HA  , IM (FluBlok, egg free) [46623]- >18 YRS ([facility] recommended  50-64 YRS)     Vaccine Administration, Initial [75074]       Initial BP today was 142/94 and repeat was improved to 130/82 which is still above goal of <130/90. I recommend we add lisinopril 10 mg and see if this helps to further lower his glucose. He will come in for weekly checks for the next month to recheck his BP and will recheck BMP in 1 month. He had one episode of slightly elevated potassium 2 years ago but since then, it has been normal. I recommend a low salt diet with routine exercise and he will have his wife monitor him closely when he is sleeping to determine if he has any apneic episodes. If he does, I recommend a sleep study.    Follow up in 6 months for a recheck.     Carlos Joseph PA-C  [facility]      Answers for HPI/ROS submitted by the patient on 10/24/2018   PHQ-2 Score: 0

## 2018-10-24 ENCOUNTER — OFFICE VISIT (OUTPATIENT)
Dept: FAMILY MEDICINE | Facility: OTHER | Age: 58
End: 2018-10-24
Payer: COMMERCIAL

## 2018-10-24 VITALS
SYSTOLIC BLOOD PRESSURE: 130 MMHG | BODY MASS INDEX: 31.04 KG/M2 | TEMPERATURE: 98.4 F | DIASTOLIC BLOOD PRESSURE: 82 MMHG | WEIGHT: 175.2 LBS | RESPIRATION RATE: 16 BRPM | HEART RATE: 64 BPM | OXYGEN SATURATION: 96 %

## 2018-10-24 DIAGNOSIS — Z23 NEED FOR PROPHYLACTIC VACCINATION AND INOCULATION AGAINST INFLUENZA: ICD-10-CM

## 2018-10-24 DIAGNOSIS — I10 BENIGN ESSENTIAL HYPERTENSION: Primary | ICD-10-CM

## 2018-10-24 DIAGNOSIS — R06.83 SNORING: ICD-10-CM

## 2018-10-24 PROCEDURE — 90682 RIV4 VACC RECOMBINANT DNA IM: CPT | Performed by: PHYSICIAN ASSISTANT

## 2018-10-24 PROCEDURE — 99214 OFFICE O/P EST MOD 30 MIN: CPT | Mod: 25 | Performed by: PHYSICIAN ASSISTANT

## 2018-10-24 PROCEDURE — 90471 IMMUNIZATION ADMIN: CPT | Performed by: PHYSICIAN ASSISTANT

## 2018-10-24 RX ORDER — LISINOPRIL 10 MG/1
10 TABLET ORAL DAILY
Qty: 90 TABLET | Refills: 3 | Status: SHIPPED | OUTPATIENT
Start: 2018-10-24 | End: 2019-10-07

## 2018-10-24 RX ORDER — HYDROCHLOROTHIAZIDE 25 MG/1
25 TABLET ORAL DAILY
Qty: 90 TABLET | Refills: 3 | Status: SHIPPED | OUTPATIENT
Start: 2018-10-24 | End: 2019-10-24

## 2018-10-24 ASSESSMENT — PAIN SCALES - GENERAL: PAINLEVEL: NO PAIN (0)

## 2018-10-24 NOTE — MR AVS SNAPSHOT
After Visit Summary   10/24/2018    Jorge Jeffers    MRN: [id number]           Patient Information     Date Of Birth          1960        Visit Information        Provider Department      10/24/2018 10:30 AM Carlos Joseph PA-C [facility]        Today's Diagnoses     Benign essential hypertension    -  1    Snoring        Need for prophylactic vaccination and inoculation against influenza          Care Instructions    Will add a medication called lisinopril to take with the hydrochlorithiazide to further help with your blood pressure.  Follow up weekly with the nurses for blood pressure checks for the next month.  Will recheck potassium in 1 month.    Ask you wife if she notices any abnormal breathing or if you stop breathing at night.  If so, I would recommend a sleep study.    Follow up in 6 months for an annual examm.          Follow-ups after your visit        Follow-up notes from your care team     Return in about 1 month (around 11/24/2018) for Lab Work.      Your next 10 appointments already scheduled     Oct 31, 2018  2:30 PM CDT   Nurse Only with NL FLOAT TEAM B, [facility] ([facility])    [address] [place] [address]   [phone]            Nov 14, 2018  2:30 PM CST   Nurse Only with NL FLOAT TEAM B, [facility] ([facility])    [address] [place] [address]   [phone]            Nov 21, 2018  2:15 PM CST   LAB with NL LAB [facility] ([facility])    [address] [place] [address]   [phone]           Please do not eat 10-12 hours before your appointment if you are coming in fasting for labs on lipids, cholesterol, or glucose (sugar). This does not apply to pregnant women. Water, hot tea and black coffee (with nothing added) are okay. Do not drink other fluids, diet soda or chew gum.            Nov  21, 2018  2:30 PM CST   Nurse Only with NL FLOAT TEAM B, [facility]   [facility] ([facility]    [address] [facility] [address]  [facility] [address]   [phone]              Future tests that were ordered for you today     Open Future Orders        Priority Expected Expires Ordered    Basic metabolic panel  (Ca, Cl, CO2, Creat, Gluc, K, Na, BUN) Routine 11/23/2018 12/24/2018 10/24/2018            Who to contact     If you have questions or need follow up information about today's clinic visit or your schedule please contact [facility] directly at [phone].  Normal or non-critical lab and imaging results will be communicated to you by ShareThehart, letter or phone within 4 business days after the clinic has received the results. If you do not hear from us within 7 days, please contact the clinic through Protochipst or phone. If you have a critical or abnormal lab result, we will notify you by phone as soon as possible.  Submit refill requests through Tapjoy or call your pharmacy and they will forward the refill request to us. Please allow 3 business days for your refill to be completed.          Additional Information About Your Visit        ShareTheharClub Venit Information     Tapjoy gives you secure access to your electronic health record. If you see a primary care provider, you can also send messages to your care team and make appointments. If you have questions, please call your primary care clinic.  If you do not have a primary care provider, please call [phone] and they will assist you.        Care EveryWhere ID     This is your Care EveryWhere ID. This could be used by other organizations to access your [facility] medical records  [id number]        Your Vitals Were     Pulse Temperature Respirations Pulse Oximetry BMI (Body Mass Index)       64 98.4  F (36.9  C) (Temporal) 16 96% 31.04 kg/m2        Blood Pressure from Last 3 Encounters:   10/24/18 130/82   06/27/18 120/84    05/10/18 134/72    Weight from Last 3 Encounters:   10/24/18 175 lb 3.2 oz (79.5 kg)   06/27/18 167 lb (75.8 kg)   04/13/18 172 lb (78 kg)              We Performed the Following     FLU VACCINE, (RIV4) RECOMBINANT HA  , IM (FluBlok, egg free) [44706]- >18 YRS ([facility] recommended  50-64 YRS)     Vaccine Administration, Initial [41410]          Today's Medication Changes          These changes are accurate as of 10/24/18 11:14 AM.  If you have any questions, ask your nurse or doctor.               Start taking these medicines.        Dose/Directions    lisinopril 10 MG tablet   Commonly known as:  PRINIVIL/ZESTRIL   Used for:  Benign essential hypertension   Started by:  Carlos Joseph PA-C        Dose:  10 mg   Take 1 tablet (10 mg) by mouth daily   Quantity:  90 tablet   Refills:  3         Stop taking these medicines if you haven't already. Please contact your care team if you have questions.     scopolamine 72 hr patch   Commonly known as:  TRANSDERM   Stopped by:  Carlos Joseph PA-C                Where to get your medicines      These medications were sent to [facility] [address] [place] [address] [facility] AT [facility] OF [facility] [address] & [facility]  [address] [facility] [address]     Phone:  [phone]     hydrochlorothiazide 25 MG tablet    lisinopril 10 MG tablet                Primary Care Provider Office Phone # Fax #    Carlos Joseph PA-C [phone]       [address] [place] [address] 100  [facility] [address]        Equal Access to Services     [facility] AH: Hadii aad ku hadasho Soomaali, waaxda luqadaha, qaybta kaalmada adeegyada, martha milian. So [facility] [phone].    ATENCIÓN: Si habla español, tiene a barrientos disposición servicios gratuitos de asistencia lingüística. Urszula keith [phone].    We comply with applicable federal civil rights laws and [place] laws. We do not discriminate on the basis of race, color, national origin, age,  disability, sex, sexual orientation, or gender identity.            Thank you!     Thank you for choosing [facility]  for your care. Our goal is always to provide you with excellent care. Hearing back from our patients is one way we can continue to improve our services. Please take a few minutes to complete the written survey that you may receive in the mail after your visit with us. Thank you!             Your Updated Medication List - Protect others around you: Learn how to safely use, store and throw away your medicines at www.disposemymeds.org.          This list is accurate as of 10/24/18 11:14 AM.  Always use your most recent med list.                   Brand Name Dispense Instructions for use Diagnosis    atorvastatin 10 MG tablet    LIPITOR    90 tablet    Take 1 tablet (10 mg) by mouth daily    Hyperlipidemia LDL goal <130       hydrochlorothiazide 25 MG tablet    HYDRODIURIL    90 tablet    Take 1 tablet (25 mg) by mouth daily    Benign essential hypertension       lisinopril 10 MG tablet    PRINIVIL/ZESTRIL    90 tablet    Take 1 tablet (10 mg) by mouth daily    Benign essential hypertension       OMEPRAZOLE PO

## 2018-10-24 NOTE — PATIENT INSTRUCTIONS
Will add a medication called lisinopril to take with the hydrochlorithiazide to further help with your blood pressure.  Follow up weekly with the nurses for blood pressure checks for the next month.  Will recheck potassium in 1 month.    Ask you wife if she notices any abnormal breathing or if you stop breathing at night.  If so, I would recommend a sleep study.    Follow up in 6 months for an annual examm.

## 2018-10-24 NOTE — PROGRESS NOTES
Prior to injection verified patient identity using patient's name and date of birth.  Due to injection administration, patient instructed to remain in clinic for 15 minutes  afterwards, and to report any adverse reaction to me immediately.    Injectable Influenza Immunization Documentation    1.  Is the person to be vaccinated sick today?   No    2. Does the person to be vaccinated have an allergy to a component   of the vaccine?   No  Egg Allergy Algorithm Link    3. Has the person to be vaccinated ever had a serious reaction   to influenza vaccine in the past?   No    4. Has the person to be vaccinated ever had Guillain-Barré syndrome?   No    Form completed by [facility]

## 2018-10-31 ENCOUNTER — ALLIED HEALTH/NURSE VISIT (OUTPATIENT)
Dept: FAMILY MEDICINE | Facility: OTHER | Age: 58
End: 2018-10-31
Payer: COMMERCIAL

## 2018-10-31 VITALS — DIASTOLIC BLOOD PRESSURE: 62 MMHG | HEART RATE: 80 BPM | SYSTOLIC BLOOD PRESSURE: 122 MMHG

## 2018-10-31 DIAGNOSIS — I10 BENIGN ESSENTIAL HYPERTENSION: Primary | ICD-10-CM

## 2018-10-31 PROCEDURE — 99207 ZZC NO CHARGE NURSE ONLY: CPT

## 2018-10-31 NOTE — MR AVS SNAPSHOT
After Visit Summary   10/31/2018    Jorge Jeffers    MRN: [id number]           Patient Information     Date Of Birth          1960        Visit Information        Provider Department      10/31/2018 2:30 PM NL FLOAT TEAM B, [facility]        Today's Diagnoses     Benign essential hypertension    -  1       Follow-ups after your visit        Your next 10 appointments already scheduled     Nov 14, 2018  2:30 PM CST   Nurse Only with NL FLOAT TEAM B, [facility] ([facility])    [address] [address]   [phone]            Nov 21, 2018  2:15 PM CST   LAB with NL LAB [facility] ([facility])    [address] [facility] [address]   [phone]           Please do not eat 10-12 hours before your appointment if you are coming in fasting for labs on lipids, cholesterol, or glucose (sugar). This does not apply to pregnant women. Water, hot tea and black coffee (with nothing added) are okay. Do not drink other fluids, diet soda or chew gum.            Nov 21, 2018  2:30 PM CST   Nurse Only with NL FLOAT TEAM B, [facility] ([facility])    [address] [place] [address]   [phone]              Who to contact     If you have questions or need follow up information about today's clinic visit or your schedule please contact [facility] directly at [phone].  Normal or non-critical lab and imaging results will be communicated to you by MyChart, letter or phone within 4 business days after the clinic has received the results. If you do not hear from us within 7 days, please contact the clinic through IRIS-RFIDhart or phone. If you have a critical or abnormal lab result, we will notify you by phone as soon as possible.  Submit refill requests through Appointuit or call your pharmacy and they will forward the refill  request to us. Please allow 3 business days for your refill to be completed.          Additional Information About Your Visit        MyChart Information     Crispy Driven Pixelshart gives you secure access to your electronic health record. If you see a primary care provider, you can also send messages to your care team and make appointments. If you have questions, please call your primary care clinic.  If you do not have a primary care provider, please call [phone] and they will assist you.        Care EveryWhere ID     This is your Care EveryWhere ID. This could be used by other organizations to access your [facility] medical records  [id number]        Your Vitals Were     Pulse                   80            Blood Pressure from Last 3 Encounters:   10/31/18 122/62   10/24/18 130/82   06/27/18 120/84    Weight from Last 3 Encounters:   10/24/18 175 lb 3.2 oz (79.5 kg)   06/27/18 167 lb (75.8 kg)   04/13/18 172 lb (78 kg)              Today, you had the following     No orders found for display       Primary Care Provider Office Phone # Fax #    Carlos Joseph PA-C [phone]       [address] [facility] 100  [facility] [address]        Equal Access to Services     JOHNATHAN MEEKS : Hadii aad ku michelleo Soelviraali, waaxda luqadaha, qaybta kaalmada adeegyada, martha milian. So [facility] [phone].    ATENCIÓN: Si habla español, tiene a barrientos disposición servicios gratuitos de asistencia lingüística. Urszula al [phone].    We comply with applicable federal civil rights laws and [place] laws. We do not discriminate on the basis of race, color, national origin, age, disability, sex, sexual orientation, or gender identity.            Thank you!     Thank you for choosing [facility]  for your care. Our goal is always to provide you with excellent care. Hearing back from our patients is one way we can continue to improve our services. Please take a few minutes to complete the  written survey that you may receive in the mail after your visit with us. Thank you!             Your Updated Medication List - Protect others around you: Learn how to safely use, store and throw away your medicines at www.disposemymeds.org.          This list is accurate as of 10/31/18  2:57 PM.  Always use your most recent med list.                   Brand Name Dispense Instructions for use Diagnosis    atorvastatin 10 MG tablet    LIPITOR    90 tablet    Take 1 tablet (10 mg) by mouth daily    Hyperlipidemia LDL goal <130       hydrochlorothiazide 25 MG tablet    HYDRODIURIL    90 tablet    Take 1 tablet (25 mg) by mouth daily    Benign essential hypertension       lisinopril 10 MG tablet    PRINIVIL/ZESTRIL    90 tablet    Take 1 tablet (10 mg) by mouth daily    Benign essential hypertension       OMEPRAZOLE PO

## 2018-10-31 NOTE — PROGRESS NOTES
Jorge Jeffers is a 58 year old patient who comes in today for a Blood Pressure check.  Initial BP:  /62  Pulse 80     80  Disposition: results routed to provider

## 2018-11-13 ENCOUNTER — ALLIED HEALTH/NURSE VISIT (OUTPATIENT)
Dept: FAMILY MEDICINE | Facility: OTHER | Age: 58
End: 2018-11-13
Payer: COMMERCIAL

## 2018-11-13 VITALS — HEART RATE: 71 BPM | DIASTOLIC BLOOD PRESSURE: 69 MMHG | SYSTOLIC BLOOD PRESSURE: 126 MMHG

## 2018-11-13 DIAGNOSIS — I10 BENIGN ESSENTIAL HYPERTENSION: Primary | ICD-10-CM

## 2018-11-13 PROCEDURE — 99207 ZZC NO CHARGE NURSE ONLY: CPT

## 2018-11-13 NOTE — PROGRESS NOTES
Jorge Jeffers is a 58 year old patient who comes in today for a Blood Pressure check.  Initial BP:  /69  Pulse 71     Data Unavailable  Disposition: follow-up as previously indicated by provider

## 2018-11-13 NOTE — MR AVS SNAPSHOT
After Visit Summary   11/13/2018    Jorge Jeffers    MRN: [id number]           Patient Information     Date Of Birth          1960        Visit Information        Provider Department      11/13/2018 11:30 AM NL FLOAT TEAM B, [facility]        Today's Diagnoses     Benign essential hypertension    -  1       Follow-ups after your visit        Your next 10 appointments already scheduled     Nov 14, 2018  2:30 PM CST   Nurse Only with NL FLOAT TEAM B, [facility] ([facility])    [address] [address]   [phone]            Nov 21, 2018  2:15 PM CST   LAB with NL LAB [facility] ([facility])    [address] [facility] [address]   [phone]           Please do not eat 10-12 hours before your appointment if you are coming in fasting for labs on lipids, cholesterol, or glucose (sugar). This does not apply to pregnant women. Water, hot tea and black coffee (with nothing added) are okay. Do not drink other fluids, diet soda or chew gum.            Nov 21, 2018  2:30 PM CST   Nurse Only with NL FLOAT TEAM B, [facility] ([facility])    [address] [place] [address]   [phone]              Who to contact     If you have questions or need follow up information about today's clinic visit or your schedule please contact [facility] directly at [phone].  Normal or non-critical lab and imaging results will be communicated to you by MyChart, letter or phone within 4 business days after the clinic has received the results. If you do not hear from us within 7 days, please contact the clinic through Sapehart or phone. If you have a critical or abnormal lab result, we will notify you by phone as soon as possible.  Submit refill requests through MCE-5 Development or call your pharmacy and they will forward the refill  request to us. Please allow 3 business days for your refill to be completed.          Additional Information About Your Visit        MyChart Information     Soundvamphart gives you secure access to your electronic health record. If you see a primary care provider, you can also send messages to your care team and make appointments. If you have questions, please call your primary care clinic.  If you do not have a primary care provider, please call [phone] and they will assist you.        Care EveryWhere ID     This is your Care EveryWhere ID. This could be used by other organizations to access your [place] medical records  [id number]        Your Vitals Were     Pulse                   71            Blood Pressure from Last 3 Encounters:   11/13/18 126/69   10/31/18 122/62   10/24/18 130/82    Weight from Last 3 Encounters:   10/24/18 175 lb 3.2 oz (79.5 kg)   06/27/18 167 lb (75.8 kg)   04/13/18 172 lb (78 kg)              Today, you had the following     No orders found for display       Primary Care Provider Office Phone # Fax #    Carlos Joseph PA-C [phone]       [address] [facility] 100  [facility] [address]        Equal Access to Services     JOHNATHAN MEEKS : Hadii aad ku hadasho Soelviraali, waaxda luqadaha, qaybta kaalmada adeegyada, martha milian. So [facility] [phone].    ATENCIÓN: Si habla español, tiene a barrientos disposición servicios gratuitos de asistencia lingüística. Urszula al [phone].    We comply with applicable federal civil rights laws and [place] laws. We do not discriminate on the basis of race, color, national origin, age, disability, sex, sexual orientation, or gender identity.            Thank you!     Thank you for choosing [facility]  for your care. Our goal is always to provide you with excellent care. Hearing back from our patients is one way we can continue to improve our services. Please take a few minutes to complete the  written survey that you may receive in the mail after your visit with us. Thank you!             Your Updated Medication List - Protect others around you: Learn how to safely use, store and throw away your medicines at www.disposemymeds.org.          This list is accurate as of 11/13/18 11:38 AM.  Always use your most recent med list.                   Brand Name Dispense Instructions for use Diagnosis    atorvastatin 10 MG tablet    LIPITOR    90 tablet    Take 1 tablet (10 mg) by mouth daily    Hyperlipidemia LDL goal <130       hydrochlorothiazide 25 MG tablet    HYDRODIURIL    90 tablet    Take 1 tablet (25 mg) by mouth daily    Benign essential hypertension       lisinopril 10 MG tablet    PRINIVIL/ZESTRIL    90 tablet    Take 1 tablet (10 mg) by mouth daily    Benign essential hypertension       OMEPRAZOLE PO

## 2018-11-21 ENCOUNTER — ALLIED HEALTH/NURSE VISIT (OUTPATIENT)
Dept: FAMILY MEDICINE | Facility: OTHER | Age: 58
End: 2018-11-21
Payer: COMMERCIAL

## 2018-11-21 VITALS — DIASTOLIC BLOOD PRESSURE: 75 MMHG | HEART RATE: 73 BPM | SYSTOLIC BLOOD PRESSURE: 125 MMHG

## 2018-11-21 DIAGNOSIS — I10 BENIGN ESSENTIAL HYPERTENSION: ICD-10-CM

## 2018-11-21 DIAGNOSIS — Z01.30 BP CHECK: Primary | ICD-10-CM

## 2018-11-21 LAB
ANION GAP SERPL CALCULATED.3IONS-SCNC: 9 MMOL/L (ref 3–14)
BUN SERPL-MCNC: 31 MG/DL (ref 7–30)
CALCIUM SERPL-MCNC: 8.9 MG/DL (ref 8.5–10.1)
CHLORIDE SERPL-SCNC: 103 MMOL/L (ref 94–109)
CO2 SERPL-SCNC: 27 MMOL/L (ref 20–32)
CREAT SERPL-MCNC: 1.1 MG/DL (ref 0.66–1.25)
GFR SERPL CREATININE-BSD FRML MDRD: 69 ML/MIN/1.7M2
GLUCOSE SERPL-MCNC: 92 MG/DL (ref 70–99)
POTASSIUM SERPL-SCNC: 4 MMOL/L (ref 3.4–5.3)
SODIUM SERPL-SCNC: 139 MMOL/L (ref 133–144)

## 2018-11-21 PROCEDURE — 80048 BASIC METABOLIC PNL TOTAL CA: CPT | Performed by: PHYSICIAN ASSISTANT

## 2018-11-21 PROCEDURE — 36415 COLL VENOUS BLD VENIPUNCTURE: CPT | Performed by: PHYSICIAN ASSISTANT

## 2018-11-21 NOTE — PROGRESS NOTES
Prior to BP check verified patient identity using patient's name and date of birth.      Changed BP med so checks once a week for a month.    Jorge Jeffers is a 58 year old patient who comes in today for a Blood Pressure check.  Initial BP:  /75 (BP Location: Right arm, Patient Position: Chair, Cuff Size: Adult Regular)  Pulse 73     Data Unavailable  Disposition: results routed to provider

## 2018-11-21 NOTE — MR AVS SNAPSHOT
After Visit Summary   11/21/2018    Jorge Jeffers    MRN: [id number]           Patient Information     Date Of Birth          1960        Visit Information        Provider Department      11/21/2018 2:30 PM KARLY MORALES TEAM B, [facility]        Today's Diagnoses     BP check    -  1       Follow-ups after your visit        Who to contact     If you have questions or need follow up information about today's clinic visit or your schedule please contact [facility] directly at [phone].  Normal or non-critical lab and imaging results will be communicated to you by THINK360hart, letter or phone within 4 business days after the clinic has received the results. If you do not hear from us within 7 days, please contact the clinic through LC [email or web address] or phone. If you have a critical or abnormal lab result, we will notify you by phone as soon as possible.  Submit refill requests through Appear Here or call your pharmacy and they will forward the refill request to us. Please allow 3 business days for your refill to be completed.          Additional Information About Your Visit        MyChart Information     Appear Here gives you secure access to your electronic health record. If you see a primary care provider, you can also send messages to your care team and make appointments. If you have questions, please call your primary care clinic.  If you do not have a primary care provider, please call [phone] and they will assist you.        Care EveryWhere ID     This is your Care EveryWhere ID. This could be used by other organizations to access your [place] medical records  [id number]        Your Vitals Were     Pulse                   73            Blood Pressure from Last 3 Encounters:   11/21/18 125/75   11/13/18 126/69   10/31/18 122/62    Weight from Last 3 Encounters:   10/24/18 175 lb 3.2 oz (79.5 kg)   06/27/18 167 lb (75.8 kg)   04/13/18 172 lb (78 kg)              Today,  you had the following     No orders found for display       Primary Care Provider Office Phone # Fax #    Carlos Joseph PA-C [phone]       [address] [place] [address] 100  [facility] [address]        Equal Access to Services     JUANITA MEEKS : Hadii aad ku hadcasho Soomaali, waaxda luqadaha, qaybta kaalmada adeegyada, martha vang hayblessing syedivancliff milian. So [facility] [phone].    ATENCIÓN: Si habla español, tiene a barrientos disposición servicios gratuitos de asistencia lingüística. Llame al [phone].    We comply with applicable federal civil rights laws and [place] laws. We do not discriminate on the basis of race, color, national origin, age, disability, sex, sexual orientation, or gender identity.            Thank you!     Thank you for choosing [facility]  for your care. Our goal is always to provide you with excellent care. Hearing back from our patients is one way we can continue to improve our services. Please take a few minutes to complete the written survey that you may receive in the mail after your visit with us. Thank you!             Your Updated Medication List - Protect others around you: Learn how to safely use, store and throw away your medicines at www.disposemymeds.org.          This list is accurate as of 11/21/18  2:38 PM.  Always use your most recent med list.                   Brand Name Dispense Instructions for use Diagnosis    atorvastatin 10 MG tablet    LIPITOR    90 tablet    Take 1 tablet (10 mg) by mouth daily    Hyperlipidemia LDL goal <130       hydrochlorothiazide 25 MG tablet    HYDRODIURIL    90 tablet    Take 1 tablet (25 mg) by mouth daily    Benign essential hypertension       lisinopril 10 MG tablet    PRINIVIL/ZESTRIL    90 tablet    Take 1 tablet (10 mg) by mouth daily    Benign essential hypertension       OMEPRAZOLE PO

## 2018-12-05 ENCOUNTER — TELEPHONE (OUTPATIENT)
Dept: FAMILY MEDICINE | Facility: OTHER | Age: 58
End: 2018-12-05

## 2018-12-05 NOTE — TELEPHONE ENCOUNTER
Reason for Call:  Other appointment    Detailed comments: pt calling, wondering about being worked in with JM today as he has some questions regarding sleep apnea and osteoporosis. Offered next opening which is next Wednesday but he wanted to try for today.     Phone Number Patient can be reached at: Cell number on file:    Telephone Information:   Mobile [phone]       Best Time: any     Can we leave a detailed message on this number? YES    Call taken on 12/5/2018 at 10:40 AM by Casi Valencia

## 2018-12-06 NOTE — PROGRESS NOTES
SUBJECTIVE:   Jorge Jeffers is a 58 year old male who presents to clinic today for the following health issues:      HPI     Patient in clinic today requesting testing for a few things. He wants to have testing for sleep apnea. He continues to snore at night and his wife states he stops breathing at times so he is ready to undergo a sleep study. He wakes up a few times during the night but feels fairly well rested during the day.     Patient also requesting testing for osteoporosis. He states that he has family history of osteoporosis in his mother and siblings to wants to be tested.    He also states there is a strong family history if coronary artery disease in his father who had a heart attack at age 67 and his grandparents so wants to know if there is any testing to evaluate the arteries of his heart.    He has been noticing intermittent tingling in the left frontal and lateral thigh for the past 7-8 months or so. It is worse when lying down and keeping the leg straight. He denies any pain in this area. He does have occasional right thigh numbness but states this has been present for the past few years ever since he injured the area while running.     Problem list and histories reviewed & adjusted, as indicated.  Additional history: none    ROS:  GENERAL: Denies fever, fatigue, weakness, weight gain, or weight loss.  CARDIOVASCULAR: Denies chest pain, shortness of breath, irregular heartbeats,  palpitations, or edema.  RESPIRATORY: Denies cough, hemoptysis, and shortness of breath.   NEUROLOGIC: +Right thigh numbness, left thigh tingling. Denies headache, fainting, dizziness, memory loss, or seizures.  PSYCHIATRIC: Denies depression, anxiety, mood swings, and thoughts of suicide.    OBJECTIVE:     /88   Pulse 78   Temp 97.9  F (36.6  C) (Temporal)   Resp 16   Wt 81.2 kg (179 lb)   SpO2 96%   BMI 31.71 kg/m    Body mass index is 31.71 kg/m .  GENERAL: healthy, alert and no distress  RESP: lungs  clear to auscultation - no rales, rhonchi or wheezes  CV: regular rate and rhythm, normal S1 S2, no S3 or S4, no murmur, click or rub, no peripheral edema  NEURO: Normal strength and tone, mentation intact and speech normal. Mildly decreased sensation over the left anterolateral thigh and right anterior thigh. Reflexes are 2+/4 throughout and symmetric.    PSYCH: mentation appears normal, affect normal/bright    ASSESSMENT/PLAN:       ICD-10-CM    1. Snoring R06.83 SLEEP EVALUATION & MANAGEMENT REFERRAL - [facility] [phone] (Age 14 if over 100 lbs)   2. FH: osteoporosis Z82.62 DX Hip/Pelvis/Spine   3. Meralgia paraesthetica, left G57.12    4. FH: CAD (coronary artery disease) Z82.49 CT Coronary Calcium Scan   5. Benign essential hypertension I10        1. Due to continued snoring and apneic episodes per wife, will order a sleep study for further evaluation.    2. Will order DEXA scan due to family history of osteoporosis.    3. Left thigh tingling consistent with meralgia paraesthestica (lateral femoral cutaneous syndrome). I recommend weight loss and avoiding tight fitting pants and underwear. Right thigh numbness he states is a more chronic issue and feels different than the left but may be due to lateral femoral cutaneous syndrome as well. He states he had a recent lumbar MRI with no evidence of nerve compression per his pain specialist.    4. He has a strong family history of CAD so I think a CT coronary calcium scan is appropriate. He has met his deductible for the year and wants this completed. He is already on a statin. ASCVD score not high enough to warrant daily aspirin.      5. Improved since starting lisinopril.    Follow up annually.         Carlos Joseph PA-C  [facility]

## 2018-12-12 ENCOUNTER — OFFICE VISIT (OUTPATIENT)
Dept: FAMILY MEDICINE | Facility: OTHER | Age: 58
End: 2018-12-12
Payer: COMMERCIAL

## 2018-12-12 VITALS
TEMPERATURE: 97.9 F | DIASTOLIC BLOOD PRESSURE: 88 MMHG | RESPIRATION RATE: 16 BRPM | SYSTOLIC BLOOD PRESSURE: 126 MMHG | WEIGHT: 179 LBS | OXYGEN SATURATION: 96 % | HEART RATE: 78 BPM | BODY MASS INDEX: 31.71 KG/M2

## 2018-12-12 DIAGNOSIS — Z82.49 FH: CAD (CORONARY ARTERY DISEASE): ICD-10-CM

## 2018-12-12 DIAGNOSIS — I10 BENIGN ESSENTIAL HYPERTENSION: ICD-10-CM

## 2018-12-12 DIAGNOSIS — Z82.62 FH: OSTEOPOROSIS: ICD-10-CM

## 2018-12-12 DIAGNOSIS — R06.83 SNORING: Primary | ICD-10-CM

## 2018-12-12 DIAGNOSIS — G57.12 MERALGIA PARAESTHETICA, LEFT: ICD-10-CM

## 2018-12-12 PROCEDURE — 99214 OFFICE O/P EST MOD 30 MIN: CPT | Performed by: PHYSICIAN ASSISTANT

## 2018-12-12 NOTE — PATIENT INSTRUCTIONS
Will order a sleep study to further evaluate for possible sleep apnea.    Will order a DEXA scan to evaluate for osteoporosis.    Will order a cardiac scan due to your family history.    Your left thigh tingling is consistent with something called lateral femoral cutaneous syndrome. I recommend weight loss and avoiding tight clothing.    Follow up annually.

## 2018-12-12 NOTE — NURSING NOTE
"Chief Complaint   Patient presents with     Sleep Problem     Panel Management     hiv, honoring choices       Initial /88   Pulse 78   Temp 97.9  F (36.6  C) (Temporal)   Resp 16   Wt 179 lb (81.2 kg)   SpO2 96%   BMI 31.71 kg/m   Estimated body mass index is 31.71 kg/m  as calculated from the following:    Height as of 4/13/18: 5' 3\" (1.6 m).    Weight as of this encounter: 179 lb (81.2 kg).  Medication Reconciliation: complete    Naomi Retana, SANDOR      "

## 2018-12-26 ENCOUNTER — ANCILLARY PROCEDURE (OUTPATIENT)
Dept: CT IMAGING | Facility: CLINIC | Age: 58
End: 2018-12-26
Attending: PHYSICIAN ASSISTANT
Payer: COMMERCIAL

## 2018-12-26 ENCOUNTER — ANCILLARY PROCEDURE (OUTPATIENT)
Dept: BONE DENSITY | Facility: CLINIC | Age: 58
End: 2018-12-26
Attending: PHYSICIAN ASSISTANT
Payer: COMMERCIAL

## 2018-12-26 ENCOUNTER — TELEPHONE (OUTPATIENT)
Dept: FAMILY MEDICINE | Facility: OTHER | Age: 58
End: 2018-12-26

## 2018-12-26 DIAGNOSIS — Z82.62 FH: OSTEOPOROSIS: ICD-10-CM

## 2018-12-26 DIAGNOSIS — Z82.49 FH: CAD (CORONARY ARTERY DISEASE): ICD-10-CM

## 2018-12-26 DIAGNOSIS — I25.10 CORONARY ARTERY CALCIFICATION SEEN ON CT SCAN: Primary | ICD-10-CM

## 2018-12-26 LAB — RADIOLOGIST FLAGS: NORMAL

## 2018-12-26 PROCEDURE — 77080 DXA BONE DENSITY AXIAL: CPT | Performed by: RADIOLOGY

## 2018-12-26 PROCEDURE — 75571 CT HRT W/O DYE W/CA TEST: CPT

## 2018-12-26 NOTE — LETTER
December 26, 2018      Jorge Jeffers  [address] [facility] [address]        Dear Jorge,     This letter is regarding your results. Your bone (DEXA) scan revealed mild osteopenia/bone thinning, but no osteoporosis. I would recommend a daily calcium + vitamin D supplement (1200-800 mg) to prevent worsening bone thinning. Your chest CT revealed mild calcifications of your coronary arteries meaning you has mild coronary artery disease. This is best treated with preventative measures including the Lipitor you are already on, a healthy, low fat, low carb diet, routine exercise, and a daily 81 mg aspirin. There were a few tiny lung nodules on the CT as well but these are benign and nothing to worry about. If you have questions, let me know. Thanks.     Carlos Joseph PA-C

## 2018-12-26 NOTE — TELEPHONE ENCOUNTER
Please call and notify patient that his bone (DEXA) scan revealed mild osteopenia/bone thinning, but no osteoporosis. I would recommend a daily calcium + vitamin D supplement (1200-800 mg) to prevent worsening bone thinning. His chest CT revealed mild calcifications of his coronary arteries meaning he has mild coronary artery disease. This is best treated with preventative measures including the Lipitor he is already on, a healthy, low fat, low carb diet, routine exercise, and a daily 81 mg aspirin. There were a few tiny lung nodules on the CT as well but these are benign and nothing to worry about. If he has questions, let me know. Thanks.    Carlos Joseph PA-C

## 2019-01-06 NOTE — TELEPHONE ENCOUNTER
He states he never went to the 10 because the 2 did the trick.   He is going on a fishing trip next week. Could he get the motion sickness patch? Uses [employer].   [phone]   Name band;

## 2019-05-29 DIAGNOSIS — E78.5 HYPERLIPIDEMIA LDL GOAL <130: Primary | ICD-10-CM

## 2019-05-29 RX ORDER — ATORVASTATIN CALCIUM 10 MG/1
10 TABLET, FILM COATED ORAL DAILY
Qty: 30 TABLET | Refills: 0 | Status: SHIPPED | OUTPATIENT
Start: 2019-05-29 | End: 2019-07-23

## 2019-05-29 NOTE — TELEPHONE ENCOUNTER
Signed Prescriptions:                        Disp   Refills    atorvastatin (LIPITOR) 10 MG tablet        30 tab*0        Sig: Take 1 tablet (10 mg) by mouth daily  Authorizing Provider: MARYJO HUGHES  Ordering User: NEGAR AKHTAR    Medication is being filled for 1 time refill only due to:  Patient needs labs fasting lipids.     Please help patient schedule lab only appt.    Negar Akhtar, RN, BSN

## 2019-07-11 ENCOUNTER — TELEPHONE (OUTPATIENT)
Dept: FAMILY MEDICINE | Facility: OTHER | Age: 59
End: 2019-07-11

## 2019-07-11 NOTE — LETTER
[address]   [facility] [address]  Phone: [phone]  July 12, 2019      Jorge Jeffers  [address] [facility] [address]      Dear Jorge,    We care about your health and have reviewed your health plan including your medical conditions, medications, and lab results.  Based on this review, it is recommended that you follow up regarding the following health topic(s):  -Cholesterol  -Coronary Artery and/or Vascular Disease  -High Blood Pressure  -Wellness (Physical) Visit     We recommend you take the following action(s):  -schedule a WELLNESS (Physical) APPOINTMENT.  We will perform the following labs: Lipids (fasting cholesterol - nothing to eat except water and/or meds for 8-10 hours).     Please call us at the [facility] - [phone] (or use [employer]) to address the above recommendations.     Thank you for trusting [facility] and we appreciate the opportunity to serve you.  We look forward to supporting your healthcare needs in the future.    Healthy Regards,    Your Health Care Team  [facility]

## 2019-07-11 NOTE — TELEPHONE ENCOUNTER
Panel Management Review    Summary:    Patient is due/failing the following:   LDL and PHYSICAL    Action needed:   Patient needs office visit for Physical.  Patient needs fasting lab only appointment    Type of outreach:    Phone, left message for patient to call back.     Questions for provider review:    None                                                                                                                                    Merry Hernandes CMA (AAMA)       Chart routed to Care Team       Patient has the following on his problem list:     Hypertension   Last three blood pressure readings:  BP Readings from Last 3 Encounters:   12/12/18 126/88   11/21/18 125/75   11/13/18 126/69     Blood pressure: Passed    HTN Guidelines:  Less than 140/90      Composite cancer screening  Chart review shows that this patient is due/due soon for the following None  .

## 2019-07-22 DIAGNOSIS — E78.5 HYPERLIPIDEMIA LDL GOAL <130: ICD-10-CM

## 2019-07-23 RX ORDER — ATORVASTATIN CALCIUM 10 MG/1
10 TABLET, FILM COATED ORAL DAILY
Qty: 30 TABLET | Refills: 0 | Status: SHIPPED | OUTPATIENT
Start: 2019-07-23 | End: 2019-10-11

## 2019-07-23 NOTE — TELEPHONE ENCOUNTER
Pending Prescriptions:                       Disp   Refills    atorvastatin (LIPITOR) 10 MG tablet       30 tab*0            Sig: Take 1 tablet (10 mg) by mouth daily    Routing refill request to provider for review/approval because:  Labs not current:  LDL  A break in medication    Negar Dunn, RN, BSN

## 2019-07-23 NOTE — TELEPHONE ENCOUNTER
Refill sent but due for updated fasting lipid panel prior to further refills.    Carlos Joseph PA-C

## 2019-08-22 ENCOUNTER — TELEPHONE (OUTPATIENT)
Dept: FAMILY MEDICINE | Facility: OTHER | Age: 59
End: 2019-08-22

## 2019-08-22 NOTE — TELEPHONE ENCOUNTER
Panel Management Review    Summary:    Patient is due/failing the following:   LDL and PHYSICAL    Action needed:   Patient needs office visit for Physical.  Patient needs fasting lab only appointment    Type of outreach:    Phone, left message for patient to call back.     Questions for provider review:    None                                                                                                                                    Merry Hernandes CMA (AAMA)       Chart routed to Care Team .      Patient has the following on his problem list:     Hypertension   Last three blood pressure readings:  BP Readings from Last 3 Encounters:   12/12/18 126/88   11/21/18 125/75   11/13/18 126/69     Blood pressure: Passed    HTN Guidelines:  Less than 140/90      Composite cancer screening  Chart review shows that this patient is due/due soon for the following None

## 2019-09-28 ENCOUNTER — HEALTH MAINTENANCE LETTER (OUTPATIENT)
Age: 59
End: 2019-09-28

## 2019-10-07 DIAGNOSIS — E78.5 HYPERLIPIDEMIA LDL GOAL <130: ICD-10-CM

## 2019-10-07 DIAGNOSIS — I10 BENIGN ESSENTIAL HYPERTENSION: ICD-10-CM

## 2019-10-07 RX ORDER — LISINOPRIL 10 MG/1
10 TABLET ORAL DAILY
Qty: 90 TABLET | Refills: 0 | Status: SHIPPED | OUTPATIENT
Start: 2019-10-07 | End: 2019-10-24

## 2019-10-07 NOTE — LETTER
[address]  [facility] [address]  Phone: [phone]    10/15/19    Jorge Jeffers  [id number] [place] [id number]      Dear Jorge,    We have tried to reach you via phone without success.  We received a refill request for Lipitor that has been refilled for 1 month only.  You need to follow up with  Your PCP for a physical prior to further refills.    Sincerely,      [facility]

## 2019-10-07 NOTE — TELEPHONE ENCOUNTER
Pending Prescriptions:                       Disp   Refills    lisinopril (PRINIVIL/ZESTRIL) 10 MG [id number] tab*0            Sig: Take 1 tablet (10 mg) by mouth daily    Medication is being filled for 1 time refill only due to:  Patient needs to be seen because due for physical 12/2019..     Marcela Power RN BSN

## 2019-10-09 ENCOUNTER — TELEPHONE (OUTPATIENT)
Dept: FAMILY MEDICINE | Facility: OTHER | Age: 59
End: 2019-10-09

## 2019-10-09 NOTE — LETTER
[address]   [facility] [address]  Phone: [phone]  October 9, 2019      Jorge Jeffers  [address] [facility] [address]      Dear Jorge,    We care about your health and have reviewed your health plan including your medical conditions, medications, and lab results.  Based on this review, it is recommended that you follow up regarding the following health topic(s):  -High Blood Pressure    We recommend you take the following action(s):  -schedule a LAB ONLY APPOINTMENT to recheck your: Lipids (fasting cholesterol - nothing to eat except water and/or meds for 8-10 hours) within the next 1-4 weeks.  If' you have had labs completed eslewhere, please let us know so we can update your records.    -schedule a WELLNESS (Physical) APPOINTMENT.  We will perform the following labs: Lipids (fasting cholesterol - nothing to eat except water and/or meds for 8-10 hours).     Please call us at the [facility] - [phone] (or use [employer]) to address the above recommendations.     Thank you for trusting [facility] and we appreciate the opportunity to serve you.  We look forward to supporting your healthcare needs in the future.    Healthy Regards,    Your Health Care Team  [facility]

## 2019-10-11 RX ORDER — ATORVASTATIN CALCIUM 10 MG/1
10 TABLET, FILM COATED ORAL DAILY
Qty: 30 TABLET | Refills: 0 | Status: SHIPPED | OUTPATIENT
Start: 2019-10-11 | End: 2019-10-24

## 2019-10-11 NOTE — TELEPHONE ENCOUNTER
LM for patient to return phone call to clinic about message below.  Merry Hernandes CMA ([facility])

## 2019-10-11 NOTE — TELEPHONE ENCOUNTER
Lipitor  Routing refill request to provider for review/approval because:  Labs not current:  FLP    Tootie Beverly, RN, BSN

## 2019-10-15 NOTE — TELEPHONE ENCOUNTER
LM for patient to return phone call to clinic about message below.  Letter sent.  Merry Hernandes CMA ([facility])

## 2019-10-16 NOTE — PROGRESS NOTES
SUBJECTIVE:   CC: Jorge Jeffers is an 59 year old male who presents for preventative health visit.     Healthy Habits:     Getting at least 3 servings of Calcium per day:  Yes    Bi-annual eye exam:  NO    Dental care twice a year:  Yes    Sleep apnea or symptoms of sleep apnea:  Excessive snoring    Diet:  Regular (no restrictions)    Frequency of exercise:  None    Taking medications regularly:  Yes    Medication side effects:  Not applicable    PHQ-2 Total Score: 0    Additional concerns today:  No    He has been having trouble losing weight lately. He is working on eating less but has not been exercising as much recently.    He has had issues with obtaining and sustaining erections for the past 6 years but it has been worse over the past year. He also has a decreased libido. He was seen at [facility] previously and was diagnosed with low testosterone but had repeat testing with Dr. Yung last year and it was normal. He has never tried any medications for the erectile dysfunction but is interested in trying something.     Today's PHQ-2 Score:   PHQ-2 ( 1999 [employer]) 10/21/2019   Q1: Little interest or pleasure in doing things 0   Q2: Feeling down, depressed or hopeless 0   PHQ-2 Score 0   Q1: Little interest or pleasure in doing things Not at all   Q2: Feeling down, depressed or hopeless Not at all   PHQ-2 Score 0     Abuse: Current or Past(Physical, Sexual or Emotional)- No  Do you feel safe in your environment? Yes    Social History     Tobacco Use     Smoking status: Former Smoker     Types: Cigars     Smokeless tobacco: Never Used     Tobacco comment: occasioanlly   Substance Use Topics     Alcohol use: Yes     Comment: 1 day a week     If you drink alcohol do you typically have >3 drinks per day or >7 drinks per week? No    Alcohol Use 10/24/2019   Prescreen: >3 drinks/day or >7 drinks/week? -   Prescreen: >3 drinks/day or >7 drinks/week? No       Last PSA:   PSA   Date Value Ref Range Status  "  02/09/2017 0.62 0 - 4 ug/L Final     Comment:     Assay Method:  Chemiluminescence using Siemens Vista analyzer     Reviewed orders with patent. Reviewed health maintenance and updated orders accordingly - Yes    Reviewed and updated as needed this visit by clinical staff  Tobacco  Allergies  Meds  Med Hx  Surg Hx  Fam Hx  Soc Hx        Reviewed and updated as needed this visit by Provider  Tobacco  Allergies  Meds  Problems  Med Hx  Surg Hx  Fam Hx      Review of Systems   Constitutional: Negative for chills and fever.   HENT: Negative for congestion, ear pain, hearing loss and sore throat.    Eyes: Negative for pain and visual disturbance.   Respiratory: Negative for cough and shortness of breath.    Cardiovascular: Negative for chest pain, palpitations and peripheral edema.   Gastrointestinal: Positive for heartburn and nausea. Negative for abdominal pain, constipation, diarrhea and hematochezia.   Genitourinary: Positive for impotence. Negative for discharge, dysuria, frequency, genital sores, hematuria and urgency.   Musculoskeletal: Positive for arthralgias and myalgias. Negative for joint swelling.   Skin: Negative for rash.   Neurological: Positive for dizziness. Negative for weakness, headaches and paresthesias.   Psychiatric/Behavioral: Negative for mood changes. The patient is not nervous/anxious.      OBJECTIVE:   /70   Pulse 68   Temp 98.2  F (36.8  C) (Temporal)   Resp 16   Ht 1.6 m (5' 3\")   Wt 81.2 kg (179 lb)   SpO2 98%   BMI 31.71 kg/m      Physical Exam  GENERAL: healthy, alert and no distress  EYES: Eyes grossly normal to inspection, PERRL and conjunctivae and sclerae normal  HENT: ear canals and TM's normal, nose and mouth without ulcers or lesions  NECK: no adenopathy, no asymmetry, masses, or scars and thyroid normal to palpation  RESP: lungs clear to auscultation - no rales, rhonchi or wheezes  CV: regular rate and rhythm, normal S1 S2, no S3 or S4, no murmur, " click or rub, no peripheral edema and peripheral pulses strong  ABDOMEN: soft, nontender, no hepatosplenomegaly, no masses and bowel sounds normal   (male): normal male circumcised genitalia without lesions or urethral discharge, no hernia  MS: no gross musculoskeletal defects noted, no edema. FROM to all extremities. No spinal tenderness.   SKIN: no suspicious lesions or rashes  NEURO: Normal strength and tone, mentation intact and speech normal. Cranial nerves II-XII are grossly intact. DTRs are 2+/4 throughout and symmetric. Gait is stable.  PSYCH: mentation appears normal, affect normal/bright    Results for orders placed or performed in visit on 10/18/19   Lipid panel reflex to direct LDL Fasting   Result Value Ref Range    Cholesterol 157 <200 mg/dL    Triglycerides 123 <150 mg/dL    HDL Cholesterol 61 >39 mg/dL    LDL Cholesterol Calculated 71 <100 mg/dL    Non HDL Cholesterol 96 <130 mg/dL       ASSESSMENT/PLAN:       ICD-10-CM    1. Routine general medical examination at a health care facility Z00.00    2. Benign essential hypertension I10 Basic metabolic panel  (Ca, Cl, CO2, Creat, Gluc, K, Na, BUN)     hydrochlorothiazide (HYDRODIURIL) 25 MG tablet     lisinopril (PRINIVIL/ZESTRIL) 10 MG tablet   3. Hyperlipidemia LDL goal <130 E78.5 atorvastatin (LIPITOR) 10 MG tablet   4. Gastroesophageal reflux disease, esophagitis presence not specified K21.9    5. Screening for thyroid disorder Z13.29 TSH with free T4 reflex     OFFICE/OUTPT VISIT,EST,LEVL III   6. Class 1 obesity due to excess calories with serious comorbidity and body mass index (BMI) of 31.0 to 31.9 in adult E66.09 TSH with free T4 reflex    Z68.31 OFFICE/OUTPT VISIT,EST,LEVL III   7. Erectile dysfunction, unspecified erectile dysfunction type N52.9 TSH with free T4 reflex     sildenafil (VIAGRA) 50 MG tablet     OFFICE/OUTPT VISIT,EST,LEVL III       2. BP is stable so will continue current doses of hydrochlorithiazide and lisinopril. Updated  "non-fasting BMP ordered.    3. Cholesterol much improved since starting Lipitor. Will continue current dose.    4. Continue with omeprazole as needed as this has been working well.    5-6. Updated TSH ordered at patient's request as he is having a hard time losing weight. I discussed the importance of small, frequent meals along with more routine exercise outside of work.    7. His ED symptoms and low libido have been worse over the past year. Testosterone levels were normal last year. I recommend a trial of Viagra to use as needed prior to intercourse. Common side effects discussed. If not helpful, will order updated testosterone levels.     Follow up annually.    COUNSELING:   Reviewed preventive health counseling, as reflected in patient instructions       Regular exercise       Healthy diet/nutrition    Estimated body mass index is 31.71 kg/m  as calculated from the following:    Height as of this encounter: 1.6 m (5' 3\").    Weight as of this encounter: 81.2 kg (179 lb).     Weight management plan: Discussed healthy diet and exercise guidelines     reports that he has quit smoking. His smoking use included cigars. He has never used smokeless tobacco.      Counseling Resources:  ATP IV Guidelines  Pooled Cohorts Equation Calculator  FRAX Risk Assessment  ICSI Preventive Guidelines  Dietary Guidelines for Americans, 2010  USDA's MyPlate  ASA Prophylaxis  Lung CA Screening    Carlos Joseph PA-C  [facility]"

## 2019-10-16 NOTE — PATIENT INSTRUCTIONS
Preventive Health Recommendations  Male Ages 50 - 64    Yearly exam:             See your health care provider every year in order to  o   Review health changes.   o   Discuss preventive care.    o   Review your medicines if your doctor has prescribed any.     Have a cholesterol test every 5 years, or more frequently if you are at risk for high cholesterol/heart disease.     Have a diabetes test (fasting glucose) every three years. If you are at risk for diabetes, you should have this test more often.     Have a colonoscopy at age 50, or have a yearly FIT test (stool test). These exams will check for colon cancer.      Talk with your health care provider about whether or not a prostate cancer screening test (PSA) is right for you.    You should be tested each year for STDs (sexually transmitted diseases), if you re at risk.     Shots: Get a flu shot each year. Get a tetanus shot every 10 years.     Nutrition:    Eat at least 5 servings of fruits and vegetables daily.     Eat whole-grain bread, whole-wheat pasta and brown rice instead of white grains and rice.     Get adequate Calcium and Vitamin D.     Lifestyle    Exercise for at least 150 minutes a week (30 minutes a day, 5 days a week). This will help you control your weight and prevent disease.     Limit alcohol to one drink per day.     No smoking.     Wear sunscreen to prevent skin cancer.     See your dentist every six months for an exam and cleaning.     See your eye doctor every 1 to 2 years.    Will try Viagra to use 1/2 to 1 tab prior to intercourse.  If not helping, let me know.    Follow up annually.

## 2019-10-18 ENCOUNTER — IMMUNIZATION (OUTPATIENT)
Dept: FAMILY MEDICINE | Facility: OTHER | Age: 59
End: 2019-10-18
Payer: COMMERCIAL

## 2019-10-18 DIAGNOSIS — E78.5 HYPERLIPIDEMIA LDL GOAL <130: ICD-10-CM

## 2019-10-18 DIAGNOSIS — Z23 NEED FOR PROPHYLACTIC VACCINATION AND INOCULATION AGAINST INFLUENZA: Primary | ICD-10-CM

## 2019-10-18 LAB
CHOLEST SERPL-MCNC: 157 MG/DL
HDLC SERPL-MCNC: 61 MG/DL
LDLC SERPL CALC-MCNC: 71 MG/DL
NONHDLC SERPL-MCNC: 96 MG/DL
TRIGL SERPL-MCNC: 123 MG/DL

## 2019-10-18 PROCEDURE — 90471 IMMUNIZATION ADMIN: CPT

## 2019-10-18 PROCEDURE — 36415 COLL VENOUS BLD VENIPUNCTURE: CPT | Performed by: PHYSICIAN ASSISTANT

## 2019-10-18 PROCEDURE — 90682 RIV4 VACC RECOMBINANT DNA IM: CPT

## 2019-10-18 PROCEDURE — 80061 LIPID PANEL: CPT | Performed by: PHYSICIAN ASSISTANT

## 2019-10-21 ASSESSMENT — ENCOUNTER SYMPTOMS
HEADACHES: 0
SORE THROAT: 0
HEMATOCHEZIA: 0
DIARRHEA: 0
ABDOMINAL PAIN: 0
DIZZINESS: 1
SHORTNESS OF BREATH: 0
FREQUENCY: 0
CONSTIPATION: 0
MYALGIAS: 1
FEVER: 0
JOINT SWELLING: 0
NERVOUS/ANXIOUS: 0
HEARTBURN: 1
ARTHRALGIAS: 1
COUGH: 0
CHILLS: 0
PARESTHESIAS: 0
NAUSEA: 1
HEMATURIA: 0
DYSURIA: 0
WEAKNESS: 0
PALPITATIONS: 0
EYE PAIN: 0

## 2019-10-24 ENCOUNTER — OFFICE VISIT (OUTPATIENT)
Dept: FAMILY MEDICINE | Facility: OTHER | Age: 59
End: 2019-10-24
Payer: COMMERCIAL

## 2019-10-24 VITALS
SYSTOLIC BLOOD PRESSURE: 128 MMHG | WEIGHT: 179 LBS | TEMPERATURE: 98.2 F | DIASTOLIC BLOOD PRESSURE: 70 MMHG | HEART RATE: 68 BPM | HEIGHT: 63 IN | BODY MASS INDEX: 31.71 KG/M2 | OXYGEN SATURATION: 98 % | RESPIRATION RATE: 16 BRPM

## 2019-10-24 DIAGNOSIS — K21.9 GASTROESOPHAGEAL REFLUX DISEASE, ESOPHAGITIS PRESENCE NOT SPECIFIED: ICD-10-CM

## 2019-10-24 DIAGNOSIS — Z13.29 SCREENING FOR THYROID DISORDER: ICD-10-CM

## 2019-10-24 DIAGNOSIS — N52.9 ERECTILE DYSFUNCTION, UNSPECIFIED ERECTILE DYSFUNCTION TYPE: ICD-10-CM

## 2019-10-24 DIAGNOSIS — E66.811 CLASS 1 OBESITY DUE TO EXCESS CALORIES WITH SERIOUS COMORBIDITY AND BODY MASS INDEX (BMI) OF 31.0 TO 31.9 IN ADULT: ICD-10-CM

## 2019-10-24 DIAGNOSIS — Z00.00 ROUTINE GENERAL MEDICAL EXAMINATION AT A HEALTH CARE FACILITY: Primary | ICD-10-CM

## 2019-10-24 DIAGNOSIS — E66.09 CLASS 1 OBESITY DUE TO EXCESS CALORIES WITH SERIOUS COMORBIDITY AND BODY MASS INDEX (BMI) OF 31.0 TO 31.9 IN ADULT: ICD-10-CM

## 2019-10-24 DIAGNOSIS — I10 BENIGN ESSENTIAL HYPERTENSION: ICD-10-CM

## 2019-10-24 DIAGNOSIS — E78.5 HYPERLIPIDEMIA LDL GOAL <130: ICD-10-CM

## 2019-10-24 LAB
ANION GAP SERPL CALCULATED.3IONS-SCNC: 7 MMOL/L (ref 3–14)
BUN SERPL-MCNC: 23 MG/DL (ref 7–30)
CALCIUM SERPL-MCNC: 8.9 MG/DL (ref 8.5–10.1)
CHLORIDE SERPL-SCNC: 99 MMOL/L (ref 94–109)
CO2 SERPL-SCNC: 29 MMOL/L (ref 20–32)
CREAT SERPL-MCNC: 1.18 MG/DL (ref 0.66–1.25)
GFR SERPL CREATININE-BSD FRML MDRD: 67 ML/MIN/{1.73_M2}
GLUCOSE SERPL-MCNC: 91 MG/DL (ref 70–99)
POTASSIUM SERPL-SCNC: 4 MMOL/L (ref 3.4–5.3)
SODIUM SERPL-SCNC: 135 MMOL/L (ref 133–144)
T4 FREE SERPL-MCNC: 0.87 NG/DL (ref 0.76–1.46)
TSH SERPL DL<=0.005 MIU/L-ACNC: 4.58 MU/L (ref 0.4–4)

## 2019-10-24 PROCEDURE — 80048 BASIC METABOLIC PNL TOTAL CA: CPT | Performed by: PHYSICIAN ASSISTANT

## 2019-10-24 PROCEDURE — 84439 ASSAY OF FREE THYROXINE: CPT | Performed by: PHYSICIAN ASSISTANT

## 2019-10-24 PROCEDURE — 99396 PREV VISIT EST AGE 40-64: CPT | Performed by: PHYSICIAN ASSISTANT

## 2019-10-24 PROCEDURE — 84443 ASSAY THYROID STIM HORMONE: CPT | Performed by: PHYSICIAN ASSISTANT

## 2019-10-24 PROCEDURE — 99213 OFFICE O/P EST LOW 20 MIN: CPT | Mod: 25 | Performed by: PHYSICIAN ASSISTANT

## 2019-10-24 PROCEDURE — 36415 COLL VENOUS BLD VENIPUNCTURE: CPT | Performed by: PHYSICIAN ASSISTANT

## 2019-10-24 RX ORDER — LISINOPRIL 10 MG/1
10 TABLET ORAL DAILY
Qty: 90 TABLET | Refills: 3 | Status: SHIPPED | OUTPATIENT
Start: 2019-10-24 | End: 2021-01-15

## 2019-10-24 RX ORDER — ATORVASTATIN CALCIUM 10 MG/1
10 TABLET, FILM COATED ORAL DAILY
Qty: 90 TABLET | Refills: 3 | Status: SHIPPED | OUTPATIENT
Start: 2019-10-24 | End: 2021-01-15

## 2019-10-24 RX ORDER — SILDENAFIL 50 MG/1
TABLET, FILM COATED ORAL
Qty: 12 TABLET | Refills: 5 | Status: SHIPPED | OUTPATIENT
Start: 2019-10-24 | End: 2021-01-18 | Stop reason: SINTOL

## 2019-10-24 RX ORDER — HYDROCHLOROTHIAZIDE 25 MG/1
25 TABLET ORAL DAILY
Qty: 90 TABLET | Refills: 3 | Status: SHIPPED | OUTPATIENT
Start: 2019-10-24 | End: 2020-12-08

## 2019-10-24 ASSESSMENT — ENCOUNTER SYMPTOMS
ARTHRALGIAS: 1
NERVOUS/ANXIOUS: 0
PARESTHESIAS: 0
FEVER: 0
JOINT SWELLING: 0
WEAKNESS: 0
SHORTNESS OF BREATH: 0
DIZZINESS: 1
FREQUENCY: 0
PALPITATIONS: 0
COUGH: 0
NAUSEA: 1
SORE THROAT: 0
ABDOMINAL PAIN: 0
DIARRHEA: 0
CONSTIPATION: 0
MYALGIAS: 1
CHILLS: 0
HEMATURIA: 0
HEMATOCHEZIA: 0
HEADACHES: 0
DYSURIA: 0
EYE PAIN: 0
HEARTBURN: 1

## 2019-10-24 ASSESSMENT — MIFFLIN-ST. JEOR: SCORE: 1527.07

## 2019-10-25 ENCOUNTER — TELEPHONE (OUTPATIENT)
Dept: FAMILY MEDICINE | Facility: OTHER | Age: 59
End: 2019-10-25

## 2019-10-25 DIAGNOSIS — E03.8 SUBCLINICAL HYPOTHYROIDISM: Primary | ICD-10-CM

## 2019-10-25 RX ORDER — LEVOTHYROXINE SODIUM 25 UG/1
25 TABLET ORAL DAILY
Qty: 90 TABLET | Refills: 0 | Status: SHIPPED | OUTPATIENT
Start: 2019-10-25 | End: 2020-01-22

## 2019-10-25 NOTE — TELEPHONE ENCOUNTER
Please call and notify patient that his glucose, kidney function, and electrolytes are all stable. His TSH (one of the thyroid tests) is elevated with a slightly low T4 (thyroid hormone). As discussed yesterday, an underactive thyroid can cause a lot of different issues so this could partially explain his difficulty losing weight, fatigue and decreased libido. I recommend starting him on levothyroxine 25 mcg daily which is a thyroid supplement that can hopefully help with these symptoms. I recommend rechecking thyroid labs in 2 months. Thanks.    Carlos Joseph PA-C

## 2019-11-11 DIAGNOSIS — E78.5 HYPERLIPIDEMIA LDL GOAL <130: ICD-10-CM

## 2019-11-13 RX ORDER — ATORVASTATIN CALCIUM 10 MG/1
10 TABLET, FILM COATED ORAL DAILY
Qty: 90 TABLET | Refills: 3 | OUTPATIENT
Start: 2019-11-13

## 2019-11-13 NOTE — TELEPHONE ENCOUNTER
Sent 10/24/19 with 1 year supply. Refill not appropriate at this time.     Tootie Beverly, RN, BSN

## 2019-12-18 DIAGNOSIS — I10 BENIGN ESSENTIAL HYPERTENSION: ICD-10-CM

## 2019-12-18 RX ORDER — HYDROCHLOROTHIAZIDE 25 MG/1
25 TABLET ORAL DAILY
Qty: 90 TABLET | Refills: 3 | Status: CANCELLED | OUTPATIENT
Start: 2019-12-18

## 2019-12-19 NOTE — TELEPHONE ENCOUNTER
Pending Prescriptions:                       Disp   Refills    hydrochlorothiazide (HYDRODIURIL) 25 MG t*90 tab*3            Sig: Take 1 tablet (25 mg) by mouth daily    Sent 10/24/2019 with 1year supply. Refill not appropriate at this time.     Tomasa Diallo, RN, BSN

## 2020-01-20 DIAGNOSIS — E03.8 SUBCLINICAL HYPOTHYROIDISM: ICD-10-CM

## 2020-01-22 RX ORDER — LEVOTHYROXINE SODIUM 25 UG/1
25 TABLET ORAL DAILY
Qty: 30 TABLET | Refills: 0 | Status: SHIPPED | OUTPATIENT
Start: 2020-01-22 | End: 2020-01-25

## 2020-01-22 NOTE — TELEPHONE ENCOUNTER
Pending Prescriptions:                       Disp   Refills    levothyroxine (SYNTHROID/LEVOTHROID) 25 MC*90 tab*0        Sig: Take 1 tablet (25 mcg) by mouth daily      Routing refill request to provider for review/approval because:  Labs out of range:  TSH    Tomasa Diallo, MSN, RN

## 2020-01-23 NOTE — TELEPHONE ENCOUNTER
LMFP to return call. Given 1 month refill needs TSH please help schedule lab appointment only.      Jolanta Joseph CMA

## 2020-01-24 DIAGNOSIS — E03.8 SUBCLINICAL HYPOTHYROIDISM: ICD-10-CM

## 2020-01-24 LAB — TSH SERPL DL<=0.005 MIU/L-ACNC: 2.17 MU/L (ref 0.4–4)

## 2020-01-24 PROCEDURE — 84443 ASSAY THYROID STIM HORMONE: CPT | Performed by: PHYSICIAN ASSISTANT

## 2020-01-24 PROCEDURE — 36415 COLL VENOUS BLD VENIPUNCTURE: CPT | Performed by: PHYSICIAN ASSISTANT

## 2020-01-25 DIAGNOSIS — E03.8 SUBCLINICAL HYPOTHYROIDISM: ICD-10-CM

## 2020-01-25 RX ORDER — LEVOTHYROXINE SODIUM 25 UG/1
25 TABLET ORAL DAILY
Qty: 90 TABLET | Refills: 3 | Status: SHIPPED | OUTPATIENT
Start: 2020-01-25 | End: 2020-02-21

## 2020-02-21 DIAGNOSIS — E03.8 SUBCLINICAL HYPOTHYROIDISM: ICD-10-CM

## 2020-02-21 RX ORDER — LEVOTHYROXINE SODIUM 25 UG/1
25 TABLET ORAL DAILY
Qty: 90 TABLET | Refills: 3 | Status: SHIPPED | OUTPATIENT
Start: 2020-02-21 | End: 2021-01-18

## 2020-02-21 NOTE — TELEPHONE ENCOUNTER
"Requested Prescriptions   Pending Prescriptions Disp Refills     levothyroxine 25 MCG PO tablet 90 tablet 3     Sig: Take 1 tablet (25 mcg) by mouth daily       Thyroid Protocol Passed - 2/21/2020 10:21 AM        Passed - Patient is 12 years or older        Passed - Recent (12 mo) or future (30 days) visit within the authorizing provider's specialty     Patient has had an office visit with the authorizing provider or a provider within the authorizing providers department within the previous 12 mos or has a future within next 30 days. See \"Patient Info\" tab in inbasket, or \"Choose Columns\" in Meds & Orders section of the refill encounter.              Passed - Medication is active on med list        Passed - Normal TSH on file in past 12 months     Recent Labs   Lab Test 01/24/20  1449   TSH 2.17              Prescription approved per [facility] Protocol.    Rashad Reed, RN, BSN    "

## 2020-12-07 DIAGNOSIS — I10 BENIGN ESSENTIAL HYPERTENSION: ICD-10-CM

## 2020-12-08 RX ORDER — HYDROCHLOROTHIAZIDE 25 MG/1
25 TABLET ORAL DAILY
Qty: 90 TABLET | Refills: 0 | Status: SHIPPED | OUTPATIENT
Start: 2020-12-08 | End: 2021-01-18

## 2020-12-08 NOTE — TELEPHONE ENCOUNTER
Pending Prescriptions:                       Disp   Refills    hydrochlorothiazide (HYDRODIURIL) 25 MG t*90 tab*0            Sig: Take 1 tablet (25 mg) by mouth daily    Medication is being filled for 1 time shasha refill only due to:  Patient is due for Medication check/BP and lab.     Please call and help schedule.  Thank you!    Melissa Lees RN  December 8, 2020

## 2021-01-08 ENCOUNTER — MYC MEDICAL ADVICE (OUTPATIENT)
Dept: FAMILY MEDICINE | Facility: OTHER | Age: 61
End: 2021-01-08

## 2021-01-10 ENCOUNTER — HEALTH MAINTENANCE LETTER (OUTPATIENT)
Age: 61
End: 2021-01-10

## 2021-01-10 NOTE — TELEPHONE ENCOUNTER
Please help schedule office visit to discuss testosterone testing. Would recommend just an annual physical with updated fasting labs. Should be a morning appointment before 10:30 am.    Carlos Joseph PA-C

## 2021-01-12 NOTE — TELEPHONE ENCOUNTER
Spoke to patient, scheduled for physical at 8am with NEHA.   Next 5 appointments (look out 90 days)    Jan 18, 2021  8:00 AM  PHYSICAL with Carlos Joseph PA-C  [facility] ([facility]) [address] [place] [id number]  [phone]        Raiza Cisneros MA

## 2021-01-14 DIAGNOSIS — E78.5 HYPERLIPIDEMIA LDL GOAL <130: ICD-10-CM

## 2021-01-14 DIAGNOSIS — I10 BENIGN ESSENTIAL HYPERTENSION: ICD-10-CM

## 2021-01-15 RX ORDER — ATORVASTATIN CALCIUM 10 MG/1
10 TABLET, FILM COATED ORAL DAILY
Qty: 90 TABLET | Refills: 0 | Status: SHIPPED | OUTPATIENT
Start: 2021-01-15 | End: 2021-01-18

## 2021-01-15 RX ORDER — LISINOPRIL 10 MG/1
10 TABLET ORAL DAILY
Qty: 90 TABLET | Refills: 0 | Status: SHIPPED | OUTPATIENT
Start: 2021-01-15 | End: 2021-01-18

## 2021-01-15 NOTE — PATIENT INSTRUCTIONS
Preventive Health Recommendations  Male Ages 50 - 64    Yearly exam:             See your health care provider every year in order to  o   Review health changes.   o   Discuss preventive care.    o   Review your medicines if your doctor has prescribed any.    Have a cholesterol test every 5 years, or more frequently if you are at risk for high cholesterol/heart disease.     Have a diabetes test (fasting glucose) every three years. If you are at risk for diabetes, you should have this test more often.     Have a colonoscopy at age 50, or have a yearly FIT test (stool test). These exams will check for colon cancer.      Talk with your health care provider about whether or not a prostate cancer screening test (PSA) is right for you.    You should be tested each year for STDs (sexually transmitted diseases), if you re at risk.     Shots: Get a flu shot each year. Get a tetanus shot every 10 years.     Nutrition:    Eat at least 5 servings of fruits and vegetables daily.     Eat whole-grain bread, whole-wheat pasta and brown rice instead of white grains and rice.     Get adequate Calcium and Vitamin D.     Lifestyle    Exercise for at least 150 minutes a week (30 minutes a day, 5 days a week). This will help you control your weight and prevent disease.     Limit alcohol to one drink per day.     No smoking.     Wear sunscreen to prevent skin cancer.     See your dentist every six months for an exam and cleaning.     See your eye doctor every 1 to 2 years.    Will check testosterone today.  If low, we can try testosterone patches.    Follow up based on lab results.

## 2021-01-15 NOTE — PROGRESS NOTES
SUBJECTIVE:   CC: Jorge Jeffers is an 60 year old male who presents for preventative health visit.     Patient has been advised of split billing requirements and indicates understanding: Yes     He reports a history of low libido over the past few years and it has worsened over the past few months. He has a self reported history of low testosterone at [facility] a few years ago but declined starting testosterone at that time. He has a few friends who are using testosterone patches and he wants to know if that is an option for him as well. He has tried Viagra in the past, but it left him with a headache and did not help him with his ED symptoms.     Healthy Habits:     Getting at least 3 servings of Calcium per day:  NO    Bi-annual eye exam:  Yes    Dental care twice a year:  Yes    Sleep apnea or symptoms of sleep apnea:  None    Diet:  Regular (no restrictions)    Frequency of exercise:  6-7 days/week    Duration of exercise:  Greater than 60 minutes    Taking medications regularly:  Yes    Medication side effects:  None    PHQ-2 Total Score: 0    Additional concerns today:  Yes    Today's PHQ-2 Score:   PHQ-2 ( 1999 Pf[employer]) 1/17/2021   Q1: Little interest or pleasure in doing things 0   Q2: Feeling down, depressed or hopeless 0   PHQ-2 Score 0   Q1: Little interest or pleasure in doing things Not at all   Q2: Feeling down, depressed or hopeless Not at all   PHQ-2 Score 0       Abuse: Current or Past(Physical, Sexual or Emotional)- No  Do you feel safe in your environment? Yes    Social History     Tobacco Use     Smoking status: Former Smoker     Types: Cigars     Smokeless tobacco: Never Used     Tobacco comment: occasioanlly   Substance Use Topics     Alcohol use: Yes     Comment: 1 day a week     If you drink alcohol do you typically have >3 drinks per day or >7 drinks per week? No    No flowsheet data found.    Last PSA:   PSA   Date Value Ref Range Status   01/18/2021 0.57 0 - 4 ug/L Final      "Comment:     Assay Method:  Chemiluminescence using Siemens Vista analyzer       Reviewed orders with patient. Reviewed health maintenance and updated orders accordingly - Yes    Reviewed and updated as needed this visit by clinical staff  Tobacco  Allergies  Meds  Problems  Med Hx  Surg Hx  Fam Hx  Soc Hx        Reviewed and updated as needed this visit by Provider  Tobacco  Allergies  Meds  Problems  Med Hx  Surg Hx  Fam Hx         Review of Systems   Constitutional: Negative for chills and fever.   HENT: Negative for congestion, ear pain, hearing loss and sore throat.    Eyes: Negative for pain and visual disturbance.   Respiratory: Negative for cough and shortness of breath.    Cardiovascular: Negative for chest pain, palpitations and peripheral edema.   Gastrointestinal: Positive for heartburn and nausea. Negative for abdominal pain, constipation, diarrhea and hematochezia.   Genitourinary: Positive for impotence. Negative for discharge, dysuria, frequency, genital sores, hematuria and urgency.   Musculoskeletal: Positive for arthralgias and myalgias. Negative for joint swelling.   Skin: Negative for rash.   Neurological: Positive for dizziness. Negative for weakness, headaches and paresthesias.   Psychiatric/Behavioral: Negative for mood changes. The patient is not nervous/anxious.    All other systems reviewed and are negative.    OBJECTIVE:   /66 (BP Location: Left arm, Patient Position: Chair, Cuff Size: Adult Regular)   Pulse 80   Temp 97.8  F (36.6  C) (Temporal)   Resp 16   Ht 1.6 m (5' 3\")   Wt 77.6 kg (171 lb)   SpO2 98%   BMI 30.29 kg/m      Physical Exam  GENERAL: healthy, alert and no distress  EYES: Eyes grossly normal to inspection, PERRL and conjunctivae and sclerae normal  HENT: ear canals and TM's normal, nose and mouth without ulcers or lesions  NECK: no adenopathy, no asymmetry, masses, or scars and thyroid normal to palpation  RESP: lungs clear to auscultation - no " rales, rhonchi or wheezes  CV: regular rate and rhythm, normal S1 S2, no S3 or S4, no murmur, click or rub, no peripheral edema and peripheral pulses strong  ABDOMEN: soft, nontender, no hepatosplenomegaly, no masses and bowel sounds normal   (male): normal male genitalia without lesions or urethral discharge, no hernia  MS: no gross musculoskeletal defects noted, no edema  SKIN: no suspicious lesions or rashes  NEURO: Normal strength and tone, mentation intact and speech normal. Cranial nerves II-XII are grossly intact. DTRs are 2+/4 throughout and symmetric. Gait is stable.   PSYCH: mentation appears normal, affect normal/bright    Diagnostic Test Results:  Results for orders placed or performed in visit on 01/18/21   Lipid panel reflex to direct LDL Fasting     Status: None   Result Value Ref Range    Cholesterol 169 <200 mg/dL    Triglycerides 113 <150 mg/dL    HDL Cholesterol 61 >39 mg/dL    LDL Cholesterol Calculated 85 <100 mg/dL    Non HDL Cholesterol 108 <130 mg/dL   BASIC METABOLIC PANEL     Status: Abnormal   Result Value Ref Range    Sodium 140 133 - 144 mmol/L    Potassium 4.8 3.4 - 5.3 mmol/L    Chloride 105 94 - 109 mmol/L    Carbon Dioxide 28 20 - 32 mmol/L    Anion Gap 7 3 - 14 mmol/L    Glucose 120 (H) 70 - 99 mg/dL    Urea Nitrogen 19 7 - 30 mg/dL    Creatinine 0.93 0.66 - 1.25 mg/dL    GFR Estimate 90 >60 mL/min/[1.73_m2]    GFR Estimate If Black >90 >60 mL/min/[1.73_m2]    Calcium 9.4 8.5 - 10.1 mg/dL   TSH with free T4 reflex     Status: None   Result Value Ref Range    TSH 2.68 0.40 - 4.00 mU/L   PSA, screen     Status: None   Result Value Ref Range    PSA 0.57 0 - 4 ug/L         ASSESSMENT/PLAN:       ICD-10-CM    1. Routine general medical examination at a health care facility  Z00.00    2. Benign essential hypertension  I10 BASIC METABOLIC PANEL     hydrochlorothiazide (HYDRODIURIL) 25 MG tablet     lisinopril (ZESTRIL) 10 MG tablet   3. Hyperlipidemia LDL goal <130  E78.5 Lipid panel  "reflex to direct LDL Fasting     atorvastatin (LIPITOR) 10 MG tablet   4. Decreased libido  R68.82 Testosterone Free and Total   5. Subclinical hypothyroidism  E03.9 TSH with free T4 reflex     levothyroxine (SYNTHROID/LEVOTHROID) 25 MCG tablet   6. Screening for prostate cancer  Z12.5 PSA, screen       2. Stable, continue hydrochlorithiazide and lisinopril. Updated fasting BMP ordered and glucose remains slightly elevated so he will continue to work on a healthier diet and more routine exercise.    3. Updated fasting lipid panel completed. Continue Lipitor.    4. Will check testosterone level to assess if it is the cause for the decreased libido. It was normal a few years ago at our clinic. If T is low, I discussed trying testosterone patches to see if they help with his symptoms. I discussed common side effects and the need for intermittent lab testing including CBC, PSA and testosterone rechecks. He is in agreement with this plan.    3. Flu shot administered by MA today    5. TSH is stable. Continue levothyroxine.    6. Updated PSA ordered and is stable.     Patient has been advised of split billing requirements and indicates understanding: Yes  COUNSELING:   Reviewed preventive health counseling, as reflected in patient instructions       Regular exercise       Healthy diet/nutrition    Estimated body mass index is 30.29 kg/m  as calculated from the following:    Height as of this encounter: 1.6 m (5' 3\").    Weight as of this encounter: 77.6 kg (171 lb).     Weight management plan: Discussed healthy diet and exercise guidelines    He reports that he has quit smoking. His smoking use included cigars. He has never used smokeless tobacco.    Counseling Resources:  ATP IV Guidelines  Pooled Cohorts Equation Calculator  FRAX Risk Assessment  ICSI Preventive Guidelines  Dietary Guidelines for Americans, 2010  USDA's MyPlate  ASA Prophylaxis  Lung CA Screening    Seen in conjunction with Juliette Gonzalez " MELISSA Joseph PA-C  [facility]

## 2021-01-17 ASSESSMENT — ENCOUNTER SYMPTOMS
EYE PAIN: 0
HEMATURIA: 0
DIZZINESS: 1
SHORTNESS OF BREATH: 0
PALPITATIONS: 0
HEARTBURN: 1
FREQUENCY: 0
NERVOUS/ANXIOUS: 0
ARTHRALGIAS: 1
MYALGIAS: 1
FEVER: 0
JOINT SWELLING: 0
NAUSEA: 1
HEMATOCHEZIA: 0
CHILLS: 0
HEADACHES: 0
SORE THROAT: 0
WEAKNESS: 0
ABDOMINAL PAIN: 0
CONSTIPATION: 0
DIARRHEA: 0
COUGH: 0
PARESTHESIAS: 0
DYSURIA: 0

## 2021-01-18 ENCOUNTER — OFFICE VISIT (OUTPATIENT)
Dept: FAMILY MEDICINE | Facility: OTHER | Age: 61
End: 2021-01-18
Payer: COMMERCIAL

## 2021-01-18 VITALS
DIASTOLIC BLOOD PRESSURE: 66 MMHG | SYSTOLIC BLOOD PRESSURE: 122 MMHG | WEIGHT: 171 LBS | HEIGHT: 63 IN | OXYGEN SATURATION: 98 % | HEART RATE: 80 BPM | RESPIRATION RATE: 16 BRPM | TEMPERATURE: 97.8 F | BODY MASS INDEX: 30.3 KG/M2

## 2021-01-18 DIAGNOSIS — Z00.00 ROUTINE GENERAL MEDICAL EXAMINATION AT A HEALTH CARE FACILITY: Primary | ICD-10-CM

## 2021-01-18 DIAGNOSIS — Z12.5 SCREENING FOR PROSTATE CANCER: ICD-10-CM

## 2021-01-18 DIAGNOSIS — I10 BENIGN ESSENTIAL HYPERTENSION: ICD-10-CM

## 2021-01-18 DIAGNOSIS — E78.5 HYPERLIPIDEMIA LDL GOAL <130: ICD-10-CM

## 2021-01-18 DIAGNOSIS — E03.8 SUBCLINICAL HYPOTHYROIDISM: ICD-10-CM

## 2021-01-18 DIAGNOSIS — R68.82 DECREASED LIBIDO: ICD-10-CM

## 2021-01-18 LAB
ANION GAP SERPL CALCULATED.3IONS-SCNC: 7 MMOL/L (ref 3–14)
BUN SERPL-MCNC: 19 MG/DL (ref 7–30)
CALCIUM SERPL-MCNC: 9.4 MG/DL (ref 8.5–10.1)
CHLORIDE SERPL-SCNC: 105 MMOL/L (ref 94–109)
CHOLEST SERPL-MCNC: 169 MG/DL
CO2 SERPL-SCNC: 28 MMOL/L (ref 20–32)
CREAT SERPL-MCNC: 0.93 MG/DL (ref 0.66–1.25)
GFR SERPL CREATININE-BSD FRML MDRD: 90 ML/MIN/{1.73_M2}
GLUCOSE SERPL-MCNC: 120 MG/DL (ref 70–99)
HDLC SERPL-MCNC: 61 MG/DL
LDLC SERPL CALC-MCNC: 85 MG/DL
NONHDLC SERPL-MCNC: 108 MG/DL
POTASSIUM SERPL-SCNC: 4.8 MMOL/L (ref 3.4–5.3)
PSA SERPL-ACNC: 0.57 UG/L (ref 0–4)
SODIUM SERPL-SCNC: 140 MMOL/L (ref 133–144)
TRIGL SERPL-MCNC: 113 MG/DL
TSH SERPL DL<=0.005 MIU/L-ACNC: 2.68 MU/L (ref 0.4–4)

## 2021-01-18 PROCEDURE — 99213 OFFICE O/P EST LOW 20 MIN: CPT | Mod: 25 | Performed by: PHYSICIAN ASSISTANT

## 2021-01-18 PROCEDURE — 99000 SPECIMEN HANDLING OFFICE-LAB: CPT | Performed by: PHYSICIAN ASSISTANT

## 2021-01-18 PROCEDURE — 36415 COLL VENOUS BLD VENIPUNCTURE: CPT | Performed by: PHYSICIAN ASSISTANT

## 2021-01-18 PROCEDURE — G0103 PSA SCREENING: HCPCS | Performed by: PHYSICIAN ASSISTANT

## 2021-01-18 PROCEDURE — 99396 PREV VISIT EST AGE 40-64: CPT | Mod: 25 | Performed by: PHYSICIAN ASSISTANT

## 2021-01-18 PROCEDURE — 84403 ASSAY OF TOTAL TESTOSTERONE: CPT | Mod: 90 | Performed by: PHYSICIAN ASSISTANT

## 2021-01-18 PROCEDURE — 90471 IMMUNIZATION ADMIN: CPT | Performed by: PHYSICIAN ASSISTANT

## 2021-01-18 PROCEDURE — 84443 ASSAY THYROID STIM HORMONE: CPT | Performed by: PHYSICIAN ASSISTANT

## 2021-01-18 PROCEDURE — 80048 BASIC METABOLIC PNL TOTAL CA: CPT | Performed by: PHYSICIAN ASSISTANT

## 2021-01-18 PROCEDURE — 84270 ASSAY OF SEX HORMONE GLOBUL: CPT | Performed by: PHYSICIAN ASSISTANT

## 2021-01-18 PROCEDURE — 80061 LIPID PANEL: CPT | Performed by: PHYSICIAN ASSISTANT

## 2021-01-18 PROCEDURE — 90715 TDAP VACCINE 7 YRS/> IM: CPT | Performed by: PHYSICIAN ASSISTANT

## 2021-01-18 RX ORDER — HYDROCHLOROTHIAZIDE 25 MG/1
25 TABLET ORAL DAILY
Qty: 90 TABLET | Refills: 3 | Status: SHIPPED | OUTPATIENT
Start: 2021-01-18 | End: 2022-02-03

## 2021-01-18 RX ORDER — LISINOPRIL 10 MG/1
10 TABLET ORAL DAILY
Qty: 90 TABLET | Refills: 3 | Status: SHIPPED | OUTPATIENT
Start: 2021-01-18 | End: 2022-02-03

## 2021-01-18 RX ORDER — LEVOTHYROXINE SODIUM 25 UG/1
25 TABLET ORAL DAILY
Qty: 90 TABLET | Refills: 3 | Status: SHIPPED | OUTPATIENT
Start: 2021-01-18 | End: 2022-02-03

## 2021-01-18 RX ORDER — ATORVASTATIN CALCIUM 10 MG/1
10 TABLET, FILM COATED ORAL DAILY
Qty: 90 TABLET | Refills: 3 | Status: SHIPPED | OUTPATIENT
Start: 2021-01-18 | End: 2022-02-03

## 2021-01-18 ASSESSMENT — ENCOUNTER SYMPTOMS
HEMATOCHEZIA: 0
SORE THROAT: 0
MYALGIAS: 1
COUGH: 0
CHILLS: 0
EYE PAIN: 0
DYSURIA: 0
HEMATURIA: 0
ARTHRALGIAS: 1
CONSTIPATION: 0
HEADACHES: 0
NAUSEA: 1
NERVOUS/ANXIOUS: 0
DIARRHEA: 0
FREQUENCY: 0
WEAKNESS: 0
ABDOMINAL PAIN: 0
FEVER: 0
DIZZINESS: 1
PALPITATIONS: 0
HEARTBURN: 1
JOINT SWELLING: 0
SHORTNESS OF BREATH: 0
PARESTHESIAS: 0

## 2021-01-18 ASSESSMENT — PAIN SCALES - GENERAL: PAINLEVEL: NO PAIN (0)

## 2021-01-18 ASSESSMENT — MIFFLIN-ST. JEOR: SCORE: 1485.78

## 2021-01-20 LAB
SHBG SERPL-SCNC: 52 NMOL/L (ref 11–80)
TESTOST FREE SERPL-MCNC: 8.83 NG/DL (ref 4.7–24.4)
TESTOST SERPL-MCNC: 559 NG/DL (ref 240–950)

## 2021-08-19 ENCOUNTER — TRANSFERRED RECORDS (OUTPATIENT)
Dept: HEALTH INFORMATION MANAGEMENT | Facility: CLINIC | Age: 61
End: 2021-08-19

## 2021-10-23 ENCOUNTER — HEALTH MAINTENANCE LETTER (OUTPATIENT)
Age: 61
End: 2021-10-23

## 2022-02-03 DIAGNOSIS — E78.5 HYPERLIPIDEMIA LDL GOAL <130: ICD-10-CM

## 2022-02-03 DIAGNOSIS — E03.8 SUBCLINICAL HYPOTHYROIDISM: ICD-10-CM

## 2022-02-03 DIAGNOSIS — I10 BENIGN ESSENTIAL HYPERTENSION: ICD-10-CM

## 2022-02-03 RX ORDER — ATORVASTATIN CALCIUM 10 MG/1
10 TABLET, FILM COATED ORAL DAILY
Qty: 30 TABLET | Refills: 0 | Status: SHIPPED | OUTPATIENT
Start: 2022-02-03 | End: 2022-04-05

## 2022-02-03 RX ORDER — HYDROCHLOROTHIAZIDE 25 MG/1
25 TABLET ORAL DAILY
Qty: 30 TABLET | Refills: 0 | Status: SHIPPED | OUTPATIENT
Start: 2022-02-03 | End: 2022-03-19

## 2022-02-03 RX ORDER — LISINOPRIL 10 MG/1
10 TABLET ORAL DAILY
Qty: 30 TABLET | Refills: 0 | Status: SHIPPED | OUTPATIENT
Start: 2022-02-03 | End: 2022-03-19

## 2022-02-03 RX ORDER — LISINOPRIL 10 MG/1
10 TABLET ORAL DAILY
Qty: 90 TABLET | Refills: 3 | OUTPATIENT
Start: 2022-02-03

## 2022-02-03 RX ORDER — LEVOTHYROXINE SODIUM 25 UG/1
25 TABLET ORAL DAILY
Qty: 30 TABLET | Refills: 0 | Status: SHIPPED | OUTPATIENT
Start: 2022-02-03 | End: 2022-10-06

## 2022-02-03 RX ORDER — HYDROCHLOROTHIAZIDE 25 MG/1
25 TABLET ORAL DAILY
Qty: 90 TABLET | Refills: 3 | OUTPATIENT
Start: 2022-02-03

## 2022-02-03 NOTE — TELEPHONE ENCOUNTER
Pending Prescriptions:                       Disp   Refills    hydrochlorothiazide (HYDRODIURIL) 25 MG ta*90 tab*3        Sig: Take 1 tablet (25 mg) by mouth daily    lisinopril (ZESTRIL) 10 MG tablet          90 tab*3        Sig: Take 1 tablet (10 mg) by mouth daily    Routing refill request to provider for review/approval because:  Patient needs to be seen because it has been more than 1 year since last office visit.  Diuretics (Including Combos) Protocol Failed 02/03/2022 12:14 PM    Blood pressure under 140/90 in past 12 months    Recent (12 mo) or future (30 days) visit within the authorizing provider's specialty    Normal serum creatinine on file in past 12 months    Normal serum potassium on file in past 12 months    Normal serum sodium on file in past 12 months

## 2022-02-04 NOTE — TELEPHONE ENCOUNTER
Short refills sent. Needs to schedule annual physical prior to further refills.    Carlos Joseph PA-C

## 2022-02-09 ENCOUNTER — TELEPHONE (OUTPATIENT)
Dept: FAMILY MEDICINE | Facility: OTHER | Age: 62
End: 2022-02-09
Payer: COMMERCIAL

## 2022-02-09 NOTE — TELEPHONE ENCOUNTER
Patient notified provider not in the office and to call to set up with partner or schedule virtual

## 2022-02-10 ENCOUNTER — VIRTUAL VISIT (OUTPATIENT)
Dept: FAMILY MEDICINE | Facility: OTHER | Age: 62
End: 2022-02-10
Payer: COMMERCIAL

## 2022-02-10 DIAGNOSIS — J01.00 ACUTE MAXILLARY SINUSITIS, RECURRENCE NOT SPECIFIED: Primary | ICD-10-CM

## 2022-02-10 DIAGNOSIS — R05.9 COUGH: ICD-10-CM

## 2022-02-10 PROCEDURE — 99213 OFFICE O/P EST LOW 20 MIN: CPT | Mod: 95 | Performed by: PHYSICIAN ASSISTANT

## 2022-02-10 RX ORDER — CODEINE PHOSPHATE AND GUAIFENESIN 10; 100 MG/5ML; MG/5ML
1-2 SOLUTION ORAL EVERY 4 HOURS PRN
Qty: 180 ML | Refills: 0 | Status: SHIPPED | OUTPATIENT
Start: 2022-02-10 | End: 2022-04-22

## 2022-02-10 NOTE — PROGRESS NOTES
"Jorge is a 61 year old who is being evaluated via a billable telephone visit.      What phone number would you like to be contacted at? [phone]  How would you like to obtain your AVS? MyChart    Assessment & Plan     Acute maxillary sinusitis, recurrence not specified  Use otc meds prn, fluids, rest  - amoxicillin-clavulanate (AUGMENTIN) 875-125 MG tablet; Take 1 tablet by mouth 2 times daily    Cough  Pt requested use at bedtime, prn   - guaiFENesin-codeine (ROBITUSSIN AC) 100-10 MG/5ML solution; Take 5-10 mLs by mouth every 4 hours as needed for cough                 Return in about 1 week (around 2/17/2022), or if symptoms worsen or fail to improve.    ALFIE [facility]    Ivory Patel is a 61 year old who presents for the following health issues     HPI     Acute Illness  Acute illness concerns:   Onset/Duration: 2 weeks  Symptoms:  Fever: no  Chills/Sweats: no  Headache (location?): no  Sinus Pressure: YES, B pressure changes from side to side  Conjunctivitis:  no  Ear Pain: no  Rhinorrhea: YES- grey yellow \"disgusting\" per pt  Congestion: YES  Sore Throat: YES able to swallow, uncomfortable to talk sometimes  Cough: YES-waxing and waning over time  Wheeze: no  Decreased Appetite: no  Nausea: no  Vomiting: no  Diarrhea: no  Dysuria/Freq.: no  Dysuria or Hematuria: no  Fatigue/Achiness: YES  Sick/Strep Exposure: no  Therapies tried and outcome: nasal spray    Had covid about 4 weeks ago, these symptoms came on after he recovered    Review of Systems   As documented above       Objective           Vitals:  No vitals were obtained today due to virtual visit.    Physical Exam   healthy, alert and no distress  PSYCH: Alert and oriented times 3; coherent speech, normal   rate and volume, able to articulate logical thoughts, able   to abstract reason, no tangential thoughts, no hallucinations   or delusions  His affect is normal and pleasant  RESP: No cough, no " audible wheezing, able to talk in full sentences  Remainder of exam unable to be completed due to telephone visits          Phone call duration: 5 minutes

## 2022-03-17 DIAGNOSIS — I10 BENIGN ESSENTIAL HYPERTENSION: ICD-10-CM

## 2022-03-18 NOTE — TELEPHONE ENCOUNTER
"Pending Prescriptions:                       Disp   Refills    lisinopril (ZESTRIL) 10 MG tablet [Pharmac*30 tab*0        Sig: TAKE 1 TABLET(10 MG) BY MOUTH DAILY    hydrochlorothiazide (HYDRODIURIL) 25 MG ta*30 tab*0        Sig: TAKE 1 TABLET(25 MG) BY MOUTH DAILY    Routing refill request to provider for review/approval because:  Requested Prescriptions   Pending Prescriptions Disp Refills    lisinopril (ZESTRIL) 10 MG tablet [Pharmacy Med Name: LISINOPRIL 10MG TABLETS] 30 tablet 0     Sig: TAKE 1 TABLET(10 MG) BY MOUTH DAILY        ACE Inhibitors (Including Combos) Protocol Failed - 3/17/2022 10:48 AM        Failed - Blood pressure under 140/90 in past 12 months       BP Readings from Last 3 Encounters:   01/18/21 122/66   10/24/19 128/70   12/12/18 126/88                 Failed - Normal serum creatinine on file in past 12 months     Recent Labs   Lab Test 01/18/21  0837   CR 0.93       Ok to refill medication if creatinine is low          Failed - Normal serum potassium on file in past 12 months     Recent Labs   Lab Test 01/18/21  0837   POTASSIUM 4.8               Passed - Recent (12 mo) or future (30 days) visit within the authorizing provider's specialty     Patient has had an office visit with the authorizing provider or a provider within the authorizing providers department within the previous 12 mos or has a future within next 30 days. See \"Patient Info\" tab in inbasket, or \"Choose Columns\" in Meds & Orders section of the refill encounter.              Passed - Medication is active on med list        Passed - Patient is age 18 or older           hydrochlorothiazide (HYDRODIURIL) 25 MG tablet [Pharmacy Med Name: HYDROCHLOROTHIAZIDE 25MG TABLETS] 30 tablet 0     Sig: TAKE 1 TABLET(25 MG) BY MOUTH DAILY        Diuretics (Including Combos) Protocol Failed - 3/17/2022 10:48 AM        Failed - Blood pressure under 140/90 in past 12 months       BP Readings from Last 3 Encounters:   01/18/21 122/66   10/24/19 " "128/70   12/12/18 126/88                 Failed - Normal serum creatinine on file in past 12 months       Recent Labs   Lab Test 01/18/21  0837   CR 0.93              Failed - Normal serum potassium on file in past 12 months       Recent Labs   Lab Test 01/18/21  0837   POTASSIUM 4.8                    Failed - Normal serum sodium on file in past 12 months       Recent Labs   Lab Test 01/18/21  0837                 Passed - Recent (12 mo) or future (30 days) visit within the authorizing provider's specialty     Patient has had an office visit with the authorizing provider or a provider within the authorizing providers department within the previous 12 mos or has a future within next 30 days. See \"Patient Info\" tab in inbasket, or \"Choose Columns\" in Meds & Orders section of the refill encounter.              Passed - Medication is active on med list        Passed - Patient is age 18 or older            Delfina Harman RN on 3/18/2022 at 4:55 PM        "

## 2022-03-19 RX ORDER — HYDROCHLOROTHIAZIDE 25 MG/1
TABLET ORAL
Qty: 30 TABLET | Refills: 0 | Status: SHIPPED | OUTPATIENT
Start: 2022-03-19 | End: 2022-04-13

## 2022-03-19 RX ORDER — LISINOPRIL 10 MG/1
TABLET ORAL
Qty: 30 TABLET | Refills: 0 | Status: SHIPPED | OUTPATIENT
Start: 2022-03-19 | End: 2022-04-13

## 2022-04-09 ENCOUNTER — HEALTH MAINTENANCE LETTER (OUTPATIENT)
Age: 62
End: 2022-04-09

## 2022-04-13 DIAGNOSIS — I10 BENIGN ESSENTIAL HYPERTENSION: ICD-10-CM

## 2022-04-13 RX ORDER — HYDROCHLOROTHIAZIDE 25 MG/1
TABLET ORAL
Qty: 30 TABLET | Refills: 0 | Status: SHIPPED | OUTPATIENT
Start: 2022-04-13 | End: 2022-05-17

## 2022-04-13 RX ORDER — LISINOPRIL 10 MG/1
TABLET ORAL
Qty: 30 TABLET | Refills: 0 | Status: SHIPPED | OUTPATIENT
Start: 2022-04-13 | End: 2022-05-17

## 2022-04-13 NOTE — TELEPHONE ENCOUNTER
"Pending Prescriptions:                       Disp   Refills    hydrochlorothiazide (HYDRODIURIL) 25 MG ta*30 tab*0        Sig: TAKE 1 TABLET(25 MG) BY MOUTH DAILY    lisinopril (ZESTRIL) 10 MG tablet [Pharmac*30 tab*0        Sig: TAKE 1 TABLET(10 MG) BY MOUTH DAILY    Routing refill request to provider for review/approval because:  Olga Lidia given x1 and patient did not follow up, please advise    Requested Prescriptions   Pending Prescriptions Disp Refills    hydrochlorothiazide (HYDRODIURIL) 25 MG tablet [Pharmacy Med Name: HYDROCHLOROTHIAZIDE 25MG TABLETS] 30 tablet 0     Sig: TAKE 1 TABLET(25 MG) BY MOUTH DAILY        Diuretics (Including Combos) Protocol Failed - 4/13/2022  5:14 AM        Failed - Blood pressure under 140/90 in past 12 months       BP Readings from Last 3 Encounters:   01/18/21 122/66   10/24/19 128/70   12/12/18 126/88                 Failed - Normal serum creatinine on file in past 12 months       Recent Labs   Lab Test 01/18/21  0837   CR 0.93              Failed - Normal serum potassium on file in past 12 months       Recent Labs   Lab Test 01/18/21  0837   POTASSIUM 4.8                    Failed - Normal serum sodium on file in past 12 months       Recent Labs   Lab Test 01/18/21  0837                 Passed - Recent (12 mo) or future (30 days) visit within the authorizing provider's specialty     Patient has had an office visit with the authorizing provider or a provider within the authorizing providers department within the previous 12 mos or has a future within next 30 days. See \"Patient Info\" tab in inbasket, or \"Choose Columns\" in Meds & Orders section of the refill encounter.              Passed - Medication is active on med list        Passed - Patient is age 18 or older           lisinopril (ZESTRIL) 10 MG tablet [Pharmacy Med Name: LISINOPRIL 10MG TABLETS] 30 tablet 0     Sig: TAKE 1 TABLET(10 MG) BY MOUTH DAILY        ACE Inhibitors (Including Combos) Protocol Failed - " "4/13/2022  5:14 AM        Failed - Blood pressure under 140/90 in past 12 months       BP Readings from Last 3 Encounters:   01/18/21 122/66   10/24/19 128/70   12/12/18 126/88                 Failed - Normal serum creatinine on file in past 12 months     Recent Labs   Lab Test 01/18/21  0837   CR 0.93       Ok to refill medication if creatinine is low          Failed - Normal serum potassium on file in past 12 months     Recent Labs   Lab Test 01/18/21  0837   POTASSIUM 4.8               Passed - Recent (12 mo) or future (30 days) visit within the authorizing provider's specialty     Patient has had an office visit with the authorizing provider or a provider within the authorizing providers department within the previous 12 mos or has a future within next 30 days. See \"Patient Info\" tab in inbasket, or \"Choose Columns\" in Meds & Orders section of the refill encounter.              Passed - Medication is active on med list        Passed - Patient is age 18 or older                    "

## 2022-04-22 ENCOUNTER — VIRTUAL VISIT (OUTPATIENT)
Dept: FAMILY MEDICINE | Facility: CLINIC | Age: 62
End: 2022-04-22
Payer: COMMERCIAL

## 2022-04-22 DIAGNOSIS — R05.9 COUGH: ICD-10-CM

## 2022-04-22 DIAGNOSIS — J01.90 ACUTE SINUSITIS WITH SYMPTOMS > 10 DAYS: Primary | ICD-10-CM

## 2022-04-22 PROCEDURE — 99213 OFFICE O/P EST LOW 20 MIN: CPT | Mod: 95 | Performed by: FAMILY MEDICINE

## 2022-04-22 RX ORDER — CODEINE PHOSPHATE AND GUAIFENESIN 10; 100 MG/5ML; MG/5ML
1-2 SOLUTION ORAL EVERY 4 HOURS PRN
Qty: 180 ML | Refills: 0 | Status: SHIPPED | OUTPATIENT
Start: 2022-04-22 | End: 2022-10-06

## 2022-04-22 ASSESSMENT — ENCOUNTER SYMPTOMS: COUGH: 1

## 2022-04-22 NOTE — PATIENT INSTRUCTIONS
At [facility], we strive to deliver an exceptional experience to you, every time we see you. If you receive a survey, please complete it as we do value your feedback.  If you have MyChart, you can expect to receive results automatically within 24 hours of their completion.  Your provider will send a note interpreting your results as well.   If you do not have MyChart, you should receive your results in about a week by mail.    Your care team:                            Family Medicine Internal Medicine   MD Jovani Talamantes MD Shantel Branch-Fleming, MD Srinivasa Vaka, MD Katya Belousova, CONCEPCION Sales CNP, MD ([facility]   Soham Rodgers, MD Bailee Hicks MD Amelia Massimini APRN CNP Kim Thein, APRN CNP Bethany Templen, MD             Clinic hours: Monday - Thursday 7 am-6 pm; Fridays 7 am-5 pm.   Urgent care: Monday - Friday 10 am- 8 pm; Saturday and Sunday 9 am-5 pm.    Clinic: [phone]       [facility]: Monday - Thursday 8 am - 7 pm; Friday 8 am - 6 pm  [facility]: [phone]

## 2022-04-22 NOTE — PROGRESS NOTES
Jorge is a 61 year old who is being evaluated via a billable telephone visit.      What phone number would you like to be contacted at? [phone]  How would you like to obtain your AVS? MyChart    Assessment & Plan     (J01.90) Acute sinusitis with symptoms > 10 days  (primary encounter diagnosis)  Comment: Treated for sinus infection with Augmentin at his last appointment (2/10/2022), which worked well without side effects.  Plan: amoxicillin-clavulanate (AUGMENTIN) 875-125 MG         tablet         Return in about 2 weeks (around 5/6/2022) for recheck if symptoms fail to resolve by then, or sooner if symptoms worsen.      (R05.9) Cough  Comment: Patient also found the cough syrup to be helpful last time, and he requests this today.  Plan: guaiFENesin-codeine (ROBITUSSIN AC) 100-10         MG/5ML solution              Michele Anderson MD  [facility]   Jorge is a 61 year old who presents for the following health issues: Suspected sinus infection    Sinus Problem   Associated symptoms include cough.   Cough    History of Present Illness       Reason for visit:  Possible sinus infection/coughing  Symptom onset:  3-4 weeks ago  Symptoms include:  Coughing, sore throat, clogged sinuses  Symptom intensity:  Severe  Symptom progression:  Staying the same  Had these symptoms before:  Yes  Has tried/received treatment for these symptoms:  Yes  Previous treatment was successful:  Yes  Prior treatment description:  Augmenten  What makes it worse:  Sitting/Laying  What makes it better:  Standing/Walking around    He eats 2-3 servings of fruits and vegetables daily.He consumes 1 sweetened beverage(s) daily.He exercises with enough effort to increase his heart rate 30 to 60 minutes per day.  He exercises with enough effort to increase his heart rate 3 or less days per week.   He is taking medications regularly.     Continues to have cough, sinus congestion, purulent nasal  discharge          Review of Systems   Respiratory: Positive for cough.             Objective           Vitals:  No vitals were obtained today due to virtual visit.    Physical Exam   healthy, alert and no distress  PSYCH: Alert and oriented times 3; coherent speech, normal   rate and volume, able to articulate logical thoughts, able   to abstract reason, no tangential thoughts, no hallucinations   or delusions  His affect is normal and pleasant  RESP: Occ cough, no audible wheezing, able to talk in full sentences  Remainder of exam unable to be completed due to telephone visits                Phone call duration: 2 minutes

## 2022-05-13 DIAGNOSIS — I10 BENIGN ESSENTIAL HYPERTENSION: ICD-10-CM

## 2022-05-16 ENCOUNTER — LAB (OUTPATIENT)
Dept: LAB | Facility: CLINIC | Age: 62
End: 2022-05-16
Payer: COMMERCIAL

## 2022-05-16 DIAGNOSIS — E78.5 HYPERLIPIDEMIA LDL GOAL <130: ICD-10-CM

## 2022-05-16 DIAGNOSIS — E03.8 SUBCLINICAL HYPOTHYROIDISM: ICD-10-CM

## 2022-05-16 DIAGNOSIS — I10 BENIGN ESSENTIAL HYPERTENSION: Primary | ICD-10-CM

## 2022-05-16 DIAGNOSIS — I10 BENIGN ESSENTIAL HYPERTENSION: ICD-10-CM

## 2022-05-16 LAB
ANION GAP SERPL CALCULATED.3IONS-SCNC: 4 MMOL/L (ref 3–14)
BUN SERPL-MCNC: 16 MG/DL (ref 7–30)
CALCIUM SERPL-MCNC: 9.1 MG/DL (ref 8.5–10.1)
CHLORIDE BLD-SCNC: 108 MMOL/L (ref 94–109)
CHOLEST SERPL-MCNC: 137 MG/DL
CO2 SERPL-SCNC: 28 MMOL/L (ref 20–32)
CREAT SERPL-MCNC: 1.08 MG/DL (ref 0.66–1.25)
FASTING STATUS PATIENT QL REPORTED: YES
GFR SERPL CREATININE-BSD FRML MDRD: 79 ML/MIN/1.73M2
GLUCOSE BLD-MCNC: 107 MG/DL (ref 70–99)
HDLC SERPL-MCNC: 54 MG/DL
LDLC SERPL CALC-MCNC: 67 MG/DL
NONHDLC SERPL-MCNC: 83 MG/DL
POTASSIUM BLD-SCNC: 4.2 MMOL/L (ref 3.4–5.3)
SODIUM SERPL-SCNC: 140 MMOL/L (ref 133–144)
TRIGL SERPL-MCNC: 79 MG/DL
TSH SERPL DL<=0.005 MIU/L-ACNC: 2.83 MU/L (ref 0.4–4)

## 2022-05-16 PROCEDURE — 36415 COLL VENOUS BLD VENIPUNCTURE: CPT

## 2022-05-16 PROCEDURE — 84443 ASSAY THYROID STIM HORMONE: CPT

## 2022-05-16 PROCEDURE — 80061 LIPID PANEL: CPT

## 2022-05-16 PROCEDURE — 80048 BASIC METABOLIC PNL TOTAL CA: CPT

## 2022-05-17 RX ORDER — HYDROCHLOROTHIAZIDE 25 MG/1
TABLET ORAL
Qty: 60 TABLET | Refills: 0 | Status: SHIPPED | OUTPATIENT
Start: 2022-05-17 | End: 2022-08-09

## 2022-05-17 RX ORDER — LISINOPRIL 10 MG/1
TABLET ORAL
Qty: 60 TABLET | Refills: 0 | Status: SHIPPED | OUTPATIENT
Start: 2022-05-17 | End: 2022-08-08

## 2022-05-17 NOTE — TELEPHONE ENCOUNTER
"Pending Prescriptions:                       Disp   Refills    lisinopril (ZESTRIL) 10 MG tablet [Pharmac*30 tab*0        Sig: TAKE 1 TABLET(10 MG) BY MOUTH DAILY    hydrochlorothiazide (HYDRODIURIL) 25 MG ta*30 tab*0        Sig: TAKE 1 TABLET(25 MG) BY MOUTH DAILY    Routing refill request to provider for review/approval because:  Labs out of range:      Requested Prescriptions   Pending Prescriptions Disp Refills    lisinopril (ZESTRIL) 10 MG tablet [Pharmacy Med Name: LISINOPRIL 10MG TABLETS] 30 tablet 0     Sig: TAKE 1 TABLET(10 MG) BY MOUTH DAILY        ACE Inhibitors (Including Combos) Protocol Failed - 5/13/2022  7:03 AM        Failed - Blood pressure under 140/90 in past 12 months       BP Readings from Last 3 Encounters:   01/18/21 122/66   10/24/19 128/70   12/12/18 126/88                 Failed - Normal serum creatinine on file in past 12 months     Recent Labs   Lab Test 05/16/22  0901   CR 1.08       Ok to refill medication if creatinine is low          Failed - Normal serum potassium on file in past 12 months     Recent Labs   Lab Test 05/16/22  0901   POTASSIUM 4.2               Passed - Recent (12 mo) or future (30 days) visit within the authorizing provider's specialty     Patient has had an office visit with the authorizing provider or a provider within the authorizing providers department within the previous 12 mos or has a future within next 30 days. See \"Patient Info\" tab in inbasket, or \"Choose Columns\" in Meds & Orders section of the refill encounter.              Passed - Medication is active on med list        Passed - Patient is age 18 or older           hydrochlorothiazide (HYDRODIURIL) 25 MG tablet [Pharmacy Med Name: HYDROCHLOROTHIAZIDE 25MG TABLETS] 30 tablet 0     Sig: TAKE 1 TABLET(25 MG) BY MOUTH DAILY        Diuretics (Including Combos) Protocol Failed - 5/13/2022  7:03 AM        Failed - Blood pressure under 140/90 in past 12 months       BP Readings from Last 3 Encounters: " "  01/18/21 122/66   10/24/19 128/70   12/12/18 126/88                 Failed - Normal serum creatinine on file in past 12 months       Recent Labs   Lab Test 05/16/22  0901   CR 1.08              Failed - Normal serum potassium on file in past 12 months       Recent Labs   Lab Test 05/16/22  0901   POTASSIUM 4.2                    Failed - Normal serum sodium on file in past 12 months       Recent Labs   Lab Test 05/16/22  0901                 Passed - Recent (12 mo) or future (30 days) visit within the authorizing provider's specialty     Patient has had an office visit with the authorizing provider or a provider within the authorizing providers department within the previous 12 mos or has a future within next 30 days. See \"Patient Info\" tab in inbasket, or \"Choose Columns\" in Meds & Orders section of the refill encounter.              Passed - Medication is active on med list        Passed - Patient is age 18 or older                    "

## 2022-08-08 ENCOUNTER — TELEPHONE (OUTPATIENT)
Dept: FAMILY MEDICINE | Facility: OTHER | Age: 62
End: 2022-08-08

## 2022-08-08 DIAGNOSIS — I10 BENIGN ESSENTIAL HYPERTENSION: ICD-10-CM

## 2022-08-08 NOTE — TELEPHONE ENCOUNTER
Needs refills of these medications Lisinopril 10mg and Hydrocholorothiazide 25mg.   Only has 4 days left.    Next 5 appointments (look out 90 days)    Sep 09, 2022  4:00 PM  (Arrive by 3:40 PM)  Adult Preventative Visit with Carlos Joseph PA-C  [facility] ([facility] ) [address] [place] [id number]  [phone]        Sabrina Hoover RN  [facility] ~ Registered Nurse  Clinic Triage ~ [place] [facility] [place]  August 8, 2022

## 2022-08-08 NOTE — TELEPHONE ENCOUNTER
Reason for Call:  Medication or medication refill:    Do you use a [facility]?  Name of the pharmacy and phone number for the current request:  [facility] [place]    Name of the medication requested:   lisinopril (ZESTRIL) 10 MG tablet    hydrochlorothiazide (HYDRODIURIL) 25 MG tablet        Other request: patient states he has 4 days worth of medication and is scheduled for 9/9/22. He needs a refill to make it to the time of the appointment.     Can we leave a detailed message on this number? YES    Phone number patient can be reached at: Home number on file [phone] (home) or Cell number on file:    Telephone Information:   Mobile [phone]       Best Time: N/a    Call taken on 8/8/2022 at 3:28 PM by Shoshana Nath

## 2022-08-09 RX ORDER — LISINOPRIL 10 MG/1
10 TABLET ORAL DAILY
Qty: 60 TABLET | Refills: 0 | Status: SHIPPED | OUTPATIENT
Start: 2022-08-09 | End: 2022-10-06

## 2022-08-09 RX ORDER — HYDROCHLOROTHIAZIDE 25 MG/1
25 TABLET ORAL DAILY
Qty: 60 TABLET | Refills: 0 | Status: SHIPPED | OUTPATIENT
Start: 2022-08-09 | End: 2022-10-06

## 2022-09-06 DIAGNOSIS — I10 BENIGN ESSENTIAL HYPERTENSION: ICD-10-CM

## 2022-09-12 NOTE — TELEPHONE ENCOUNTER
"Pending Prescriptions:                       Disp   Refills    lisinopril (ZESTRIL) 10 MG tablet [Pharmac*60 tab*0        Sig: TAKE 1 TABLET(10 MG) BY MOUTH DAILY    hydrochlorothiazide (HYDRODIURIL) 25 MG ta*60 tab*0        Sig: TAKE 1 TABLET(25 MG) BY MOUTH DAILY    Routing refill request to provider for review/approval because:  Labs not current:      Requested Prescriptions   Pending Prescriptions Disp Refills    lisinopril (ZESTRIL) 10 MG tablet [Pharmacy Med Name: LISINOPRIL 10MG TABLETS] 60 tablet 0     Sig: TAKE 1 TABLET(10 MG) BY MOUTH DAILY        ACE Inhibitors (Including Combos) Protocol Failed - 9/6/2022  2:29 PM        Failed - Blood pressure under 140/90 in past 12 months       BP Readings from Last 3 Encounters:   01/18/21 122/66   10/24/19 128/70   12/12/18 126/88                 Passed - Recent (12 mo) or future (30 days) visit within the authorizing provider's specialty     Patient has had an office visit with the authorizing provider or a provider within the authorizing providers department within the previous 12 mos or has a future within next 30 days. See \"Patient Info\" tab in inbasket, or \"Choose Columns\" in Meds & Orders section of the refill encounter.              Passed - Medication is active on med list        Passed - Patient is age 18 or older        Passed - Normal serum creatinine on file in past 12 months     Recent Labs   Lab Test 05/16/22  0901   CR 1.08       Ok to refill medication if creatinine is low          Passed - Normal serum potassium on file in past 12 months     Recent Labs   Lab Test 05/16/22  0901   POTASSIUM 4.2                  hydrochlorothiazide (HYDRODIURIL) 25 MG tablet [Pharmacy Med Name: HYDROCHLOROTHIAZIDE 25MG TABLETS] 60 tablet 0     Sig: TAKE 1 TABLET(25 MG) BY MOUTH DAILY        Diuretics (Including Combos) Protocol Failed - 9/6/2022  2:29 PM        Failed - Blood pressure under 140/90 in past 12 months       BP Readings from Last 3 Encounters:   01/18/21 " "122/66   10/24/19 128/70   12/12/18 126/88                 Passed - Recent (12 mo) or future (30 days) visit within the authorizing provider's specialty     Patient has had an office visit with the authorizing provider or a provider within the authorizing providers department within the previous 12 mos or has a future within next 30 days. See \"Patient Info\" tab in inbasket, or \"Choose Columns\" in Meds & Orders section of the refill encounter.              Passed - Medication is active on med list        Passed - Patient is age 18 or older        Passed - Normal serum creatinine on file in past 12 months       Recent Labs   Lab Test 05/16/22  0901   CR 1.08              Passed - Normal serum potassium on file in past 12 months       Recent Labs   Lab Test 05/16/22  0901   POTASSIUM 4.2                    Passed - Normal serum sodium on file in past 12 months       Recent Labs   Lab Test 05/16/22  0901                             "

## 2022-09-13 RX ORDER — LISINOPRIL 10 MG/1
TABLET ORAL
Qty: 30 TABLET | Refills: 0 | OUTPATIENT
Start: 2022-09-13

## 2022-09-13 RX ORDER — HYDROCHLOROTHIAZIDE 25 MG/1
TABLET ORAL
Qty: 30 TABLET | Refills: 0 | OUTPATIENT
Start: 2022-09-13

## 2022-09-16 NOTE — TELEPHONE ENCOUNTER
[employer] message sent. Postponing to see if the patient views message.     Royer Black

## 2022-10-03 ASSESSMENT — ENCOUNTER SYMPTOMS
HEMATURIA: 0
MYALGIAS: 0
EYE PAIN: 0
NERVOUS/ANXIOUS: 0
CONSTIPATION: 0
HEARTBURN: 0
SHORTNESS OF BREATH: 0
HEMATOCHEZIA: 0
ABDOMINAL PAIN: 0
CHILLS: 0
WEAKNESS: 0
FEVER: 0
ARTHRALGIAS: 0
FREQUENCY: 0
HEADACHES: 0
PALPITATIONS: 0
DYSURIA: 0
SORE THROAT: 0
NAUSEA: 0
COUGH: 0
DIARRHEA: 0
JOINT SWELLING: 0
DIZZINESS: 0
PARESTHESIAS: 0

## 2022-10-06 ENCOUNTER — OFFICE VISIT (OUTPATIENT)
Dept: FAMILY MEDICINE | Facility: OTHER | Age: 62
End: 2022-10-06
Payer: COMMERCIAL

## 2022-10-06 VITALS
HEART RATE: 75 BPM | WEIGHT: 174 LBS | TEMPERATURE: 97.4 F | BODY MASS INDEX: 29.71 KG/M2 | HEIGHT: 64 IN | OXYGEN SATURATION: 99 % | SYSTOLIC BLOOD PRESSURE: 130 MMHG | DIASTOLIC BLOOD PRESSURE: 88 MMHG | RESPIRATION RATE: 16 BRPM

## 2022-10-06 DIAGNOSIS — I10 BENIGN ESSENTIAL HYPERTENSION: ICD-10-CM

## 2022-10-06 DIAGNOSIS — H61.23 BILATERAL IMPACTED CERUMEN: ICD-10-CM

## 2022-10-06 DIAGNOSIS — E03.8 SUBCLINICAL HYPOTHYROIDISM: ICD-10-CM

## 2022-10-06 DIAGNOSIS — E78.5 HYPERLIPIDEMIA LDL GOAL <130: ICD-10-CM

## 2022-10-06 DIAGNOSIS — Z00.00 ROUTINE GENERAL MEDICAL EXAMINATION AT A HEALTH CARE FACILITY: Primary | ICD-10-CM

## 2022-10-06 PROCEDURE — 99214 OFFICE O/P EST MOD 30 MIN: CPT | Mod: 25 | Performed by: PHYSICIAN ASSISTANT

## 2022-10-06 PROCEDURE — 69210 REMOVE IMPACTED EAR WAX UNI: CPT | Mod: 50 | Performed by: PHYSICIAN ASSISTANT

## 2022-10-06 PROCEDURE — 99396 PREV VISIT EST AGE 40-64: CPT | Mod: 25 | Performed by: PHYSICIAN ASSISTANT

## 2022-10-06 RX ORDER — ATORVASTATIN CALCIUM 10 MG/1
10 TABLET, FILM COATED ORAL DAILY
Qty: 90 TABLET | Refills: 3 | Status: SHIPPED | OUTPATIENT
Start: 2022-10-06 | End: 2023-05-11

## 2022-10-06 RX ORDER — LISINOPRIL 10 MG/1
10 TABLET ORAL DAILY
Qty: 90 TABLET | Refills: 3 | Status: SHIPPED | OUTPATIENT
Start: 2022-10-06 | End: 2023-11-27

## 2022-10-06 RX ORDER — LEVOTHYROXINE SODIUM 25 UG/1
25 TABLET ORAL DAILY
Qty: 90 TABLET | Refills: 3 | Status: SHIPPED | OUTPATIENT
Start: 2022-10-06

## 2022-10-06 RX ORDER — HYDROCHLOROTHIAZIDE 25 MG/1
25 TABLET ORAL DAILY
Qty: 90 TABLET | Refills: 3 | Status: SHIPPED | OUTPATIENT
Start: 2022-10-06 | End: 2023-05-11

## 2022-10-06 ASSESSMENT — PAIN SCALES - GENERAL: PAINLEVEL: NO PAIN (0)

## 2022-10-06 ASSESSMENT — ENCOUNTER SYMPTOMS
HEADACHES: 0
JOINT SWELLING: 0
HEMATURIA: 0
FREQUENCY: 0
MYALGIAS: 0
ABDOMINAL PAIN: 0
DIZZINESS: 0
NERVOUS/ANXIOUS: 0
COUGH: 0
PARESTHESIAS: 0
HEARTBURN: 0
DIARRHEA: 0
SORE THROAT: 0
CHILLS: 0
ARTHRALGIAS: 0
FEVER: 0
EYE PAIN: 0
PALPITATIONS: 0
NAUSEA: 0
HEMATOCHEZIA: 0
SHORTNESS OF BREATH: 0
CONSTIPATION: 0
WEAKNESS: 0
DYSURIA: 0

## 2022-10-06 NOTE — PROGRESS NOTES
SUBJECTIVE:   CC: Ketan is an 62 year old who presents for preventative health visit.     Patient has been advised of split billing requirements and indicates understanding: Yes  Healthy Habits:     Getting at least 3 servings of Calcium per day:  Yes    Bi-annual eye exam:  NO    Dental care twice a year:  Yes    Sleep apnea or symptoms of sleep apnea:  None    Diet:  Regular (no restrictions)    Frequency of exercise:  2-3 days/week    Duration of exercise:  30-45 minutes    Medication side effects:  None    PHQ-2 Total Score: 0    Additional concerns today:  No    No new concerns at this time.     Today's PHQ-2 Score:   PHQ-2 ( 1999 [employer]) 10/3/2022   Q1: Little interest or pleasure in doing things 0   Q2: Feeling down, depressed or hopeless 0   PHQ-2 Score 0   PHQ-2 Total Score (12-17 Years)- Positive if 3 or more points; Administer PHQ-A if positive -   Q1: Little interest or pleasure in doing things Not at all   Q2: Feeling down, depressed or hopeless Not at all   PHQ-2 Score 0     Abuse: Current or Past(Physical, Sexual or Emotional)- No  Do you feel safe in your environment? Yes    Have you ever done Advance Care Planning? (For example, a Health Directive, POLST, or a discussion with a medical provider or your loved ones about your wishes): No, advance care planning information given to patient to review.  Patient plans to discuss their wishes with loved ones or provider.      Social History     Tobacco Use     Smoking status: Former Smoker     Types: Cigars     Smokeless tobacco: Never Used     Tobacco comment: occasioanlly   Substance Use Topics     Alcohol use: Yes     Comment: 1 day a week     If you drink alcohol do you typically have >3 drinks per day or >7 drinks per week? No    Alcohol Use 10/6/2022   Prescreen: >3 drinks/day or >7 drinks/week? -   Prescreen: >3 drinks/day or >7 drinks/week? No     Last PSA:   PSA   Date Value Ref Range Status   01/18/2021 0.57 0 - 4 ug/L Final     Comment:     Assay  "Method:  Chemiluminescence using Siemens Vista analyzer     Reviewed orders with patient. Reviewed health maintenance and updated orders accordingly - Yes    Reviewed and updated as needed this visit by clinical staff   Tobacco  Allergies  Meds  Problems  Med Hx  Surg Hx  Fam Hx  Soc   Hx          Reviewed and updated as needed this visit by Provider   Tobacco  Allergies  Meds  Problems  Med Hx  Surg Hx  Fam Hx             Review of Systems   Constitutional: Negative for chills and fever.   HENT: Negative for congestion, ear pain, hearing loss and sore throat.    Eyes: Negative for pain and visual disturbance.   Respiratory: Negative for cough and shortness of breath.    Cardiovascular: Negative for chest pain, palpitations and peripheral edema.   Gastrointestinal: Negative for abdominal pain, constipation, diarrhea, heartburn, hematochezia and nausea.   Genitourinary: Negative for dysuria, frequency, genital sores, hematuria, impotence, penile discharge and urgency.   Musculoskeletal: Negative for arthralgias, joint swelling and myalgias.   Skin: Negative for rash.   Neurological: Negative for dizziness, weakness, headaches and paresthesias.   Psychiatric/Behavioral: Negative for mood changes. The patient is not nervous/anxious.        OBJECTIVE:   /88 (BP Location: Right arm, Cuff Size: Adult Regular)   Pulse 75   Temp 97.4  F (36.3  C) (Temporal)   Resp 16   Ht 1.615 m (5' 3.58\")   Wt 78.9 kg (174 lb)   SpO2 99%   BMI 30.26 kg/m      Physical Exam  GENERAL: healthy, alert and no distress  EYES: Eyes grossly normal to inspection, PERRL and conjunctivae and sclerae normal  HENT: ear canals and TM's normal, nose and mouth without ulcers or lesions  NECK: no adenopathy, no asymmetry, masses, or scars and thyroid normal to palpation  RESP: lungs clear to auscultation - no rales, rhonchi or wheezes  CV: regular rate and rhythm, normal S1 S2, no S3 or S4, no murmur, click or rub, no peripheral " edema and peripheral pulses strong  ABDOMEN: soft, nontender, no hepatosplenomegaly, no masses and bowel sounds normal   (male): normal male circumcised genitalia without lesions or urethral discharge, no hernia  MS: no gross musculoskeletal defects noted, no edema. FROM to all extremities. No spinal tenderness.   SKIN: no suspicious lesions or rashes  NEURO: Normal strength and tone, mentation intact and speech normal. Cranial nerves II-XII are grossly intact. DTRs are 2+/4 throughout and symmetric. Gait is stable. .  PSYCH: mentation appears normal, affect normal/bright      ASSESSMENT/PLAN:       ICD-10-CM    1. Routine general medical examination at a health care facility  Z00.00    2. Hyperlipidemia LDL goal <130  E78.5 atorvastatin (LIPITOR) 10 MG tablet   3. Subclinical hypothyroidism  E03.8 levothyroxine (SYNTHROID/LEVOTHROID) 25 MCG tablet   4. Benign essential hypertension  I10 lisinopril (ZESTRIL) 10 MG tablet     hydrochlorothiazide (HYDRODIURIL) 25 MG tablet   5. Bilateral impacted cerumen  H61.23 REMOVE IMPACTED CERUMEN       1. Routine annual physical complete.    2. He had stable cholesterol in May. Continue current dose of Lipitor.    3. Stable thyroid labs in May. Continue levothyroxine.    4. Blood pressure has been stable at this point. He does take his pressures at home a couple times a week and obtains results similar to ours in the clinic. Continue on lisinopril and hydrochlorothiazide regimen.     5. I was able to remove the impacted cerumen completely from the bilateral ears using a cerumen spoon. He tolerated this well.       He declines any vaccines today.     Follow-up annually.    Patient has been advised of split billing requirements and indicates understanding: Yes    COUNSELING:   Reviewed preventive health counseling, as reflected in patient instructions    Estimated body mass index is 30.26 kg/m  as calculated from the following:    Height as of this encounter: 1.615 m (5'  "3.58\").    Weight as of this encounter: 78.9 kg (174 lb).         He reports that he has quit smoking. His smoking use included cigars. He has never used smokeless tobacco.    Counseling Resources:  ATP IV Guidelines  Pooled Cohorts Equation Calculator  FRAX Risk Assessment  ICSI Preventive Guidelines  Dietary Guidelines for Americans, 2010  USDA's MyPlate  ASA Prophylaxis  Lung CA Screening    The patient indicates understanding of these issues and agrees with the plan.     I, Carlos Joseph PA-C, was present with the PA student who participated in the service and in the documentation of the note.  I have verified the history and personally performed the physical exam and medical decision making.  I agree with the assessment and plan of care as documented in the note.      Dick Meraz PA-S2  [facility]     DAE Shannon-C  [facility]  "

## 2022-10-06 NOTE — PATIENT INSTRUCTIONS
Preventive Health Recommendations  Male Ages 50 - 64    Yearly exam:             See your health care provider every year in order to  o   Review health changes.   o   Discuss preventive care.    o   Review your medicines if your doctor has prescribed any.   Have a cholesterol test every 5 years, or more frequently if you are at risk for high cholesterol/heart disease.   Have a diabetes test (fasting glucose) every three years. If you are at risk for diabetes, you should have this test more often.   Have a colonoscopy at age 50, or have a yearly FIT test (stool test). These exams will check for colon cancer.    Talk with your health care provider about whether or not a prostate cancer screening test (PSA) is right for you.  You should be tested each year for STDs (sexually transmitted diseases), if you re at risk.     Shots: Get a flu shot each year. Get a tetanus shot every 10 years.     Nutrition:  Eat at least 5 servings of fruits and vegetables daily.   Eat whole-grain bread, whole-wheat pasta and brown rice instead of white grains and rice.   Get adequate Calcium and Vitamin D.     Lifestyle  Exercise for at least 150 minutes a week (30 minutes a day, 5 days a week). This will help you control your weight and prevent disease.   Limit alcohol to one drink per day.   No smoking.   Wear sunscreen to prevent skin cancer.   See your dentist every six months for an exam and cleaning.   See your eye doctor every 1 to 2 years.    Follow-up annually.

## 2022-10-09 ENCOUNTER — HEALTH MAINTENANCE LETTER (OUTPATIENT)
Age: 62
End: 2022-10-09

## 2023-03-13 NOTE — PROGRESS NOTES
"  SUBJECTIVE:                                                    Jorge Jeffers is a 56 year old male who presents to clinic today for the following health issues:    HPI    Hypertension Follow-up      Outpatient blood pressures are not being checked.    Low Salt Diet: no added salt     Still having urinary urgency, feeling as if he will have to urinate often which is very \"annoying\". He also will have a burning sensation towards the end of his stream. He has had two normal urinalyses over the past month but the symptoms persist. He was started on Hytrin for BPH last week without any benefit as of yet. He was started on 2 mg nightly but has since increased to 4 mg. He denies any decreased urinary stream or incontinence. He denies any new sexual partners as he has been with his wife for 28 years.     He has been following up for BP checks and BP has improved since last month.    BP Readings from Last 6 Encounters:   02/16/17 128/84   02/09/17 134/86   01/30/17 132/70   01/23/17 138/88   01/23/17 140/90   01/16/17 (!) 170/104     He has cut back on the daily nuts and bananas since finding out his potassium was elevated last month.    Has been tolerating Lipitor well since starting this last month.     Problem list and histories reviewed & adjusted, as indicated.  Additional history: none    Problem list, Medication list, Allergies, and Medical/Social/Surgical histories reviewed in [facility] and updated as appropriate.    ROS:  GENERAL: Denies fever, fatigue, weakness, weight gain, or weight loss.  CARDIOVASCULAR: Denies chest pain, shortness of breath, irregular heartbeats,  palpitations, or edema.  RESPIRATORY: Denies cough, hemoptysis, and shortness of breath.  GASTROINTESTINAL: Denies nausea, vomiting, change in appetite, abdominal pain, diarrhea, or constipation.  GENITOURINARY: +Urgency, frequency, dysuria. Denies hematuria or incontinence.   NEUROLOGIC: Denies headache, fainting, dizziness, memory loss, numbness, " "tingling, or seizures.    OBJECTIVE:                                                    /84  Pulse 63  Temp 97.9  F (36.6  C) (Temporal)  Resp 16  Ht 5' 2.5\" (1.588 m)  Wt 172 lb (78 kg)  SpO2 98%  BMI 30.96 kg/m2  Body mass index is 30.96 kg/(m^2).  GENERAL: healthy, alert and no distress  RESP: lungs clear to auscultation - no rales, rhonchi or wheezes  CV: regular rate and rhythm, normal S1 S2, no S3 or S4, no murmur, click or rub, no peripheral edema  ABDOMEN: soft, nontender, no hepatosplenomegaly, no masses and bowel sounds normal       ASSESSMENT/PLAN:                                                        ICD-10-CM    1. Benign non-nodular prostatic hyperplasia with lower urinary tract symptoms N40.1 terazosin (HYTRIN) 10 MG capsule   2. Urinary urgency R39.15    3. Hyperkalemia E87.5 Potassium   4. Elevated blood pressure reading without diagnosis of hypertension R03.0    5. Hyperlipidemia LDL goal <130 E78.5 Lipid Profile with reflex to direct LDL       1-2. I still think symptoms are consistent with BPH, especially since UA has been normal and he had a 2-3+ prostate on exam last month. I recommend he increase the Hytrin to 6 mg nightly for 5 days then 8 mg nightly for 5 days then 10 mg nightly thereafter. If he is still not having symptomatic improvement over the next 2-4 weeks, he will let me know and I will send him to urology for a second opinion. Encouraged to continue with plenty of fluids.    3. Will order updated potassium. He will continue to avoid excess potassium rich foods.    4. Initial BP is borderline today but second reading is normal. I recommend he continue to work on a low salt diet with 4-5 days per week of exercise. He should check his BP at work a few times per week and if consistently above 140/90, he will let me know.    5. Tolerating Lipitor well, repeat fasting lipid panel in 3 months.    Follow up in 3 months.     Carlos Joseph PA-C  [facility]

## 2023-05-11 DIAGNOSIS — I10 BENIGN ESSENTIAL HYPERTENSION: ICD-10-CM

## 2023-05-11 DIAGNOSIS — E78.5 HYPERLIPIDEMIA LDL GOAL <130: ICD-10-CM

## 2023-05-11 RX ORDER — HYDROCHLOROTHIAZIDE 25 MG/1
25 TABLET ORAL DAILY
Qty: 90 TABLET | Refills: 3 | Status: SHIPPED | OUTPATIENT
Start: 2023-05-11

## 2023-05-11 RX ORDER — ATORVASTATIN CALCIUM 10 MG/1
10 TABLET, FILM COATED ORAL DAILY
Qty: 90 TABLET | Refills: 3 | Status: SHIPPED | OUTPATIENT
Start: 2023-05-11

## 2023-05-11 NOTE — TELEPHONE ENCOUNTER
Medication Question or Refill        What medication are you calling about (include dose and sig)?: Lipitor 10 and Hydrodiuril 25    Preferred Pharmacy:       [facility] [address] [address] [facility]  [address] [address] [address]  Phone: [phone] Fax: [phone]          Controlled Substance Agreement on file:   CSA -- Patient Level:    CSA: None found at the patient level.       Who prescribed the medication?: PCP    Do you need a refill? yes    When did you use the medication last? today    Patient offered an appointment? Only needs refill    Do you have any questions or concerns?  no    Could we send this information to you in [facility] or would you prefer to receive a phone call?:   call

## 2023-11-27 DIAGNOSIS — I10 BENIGN ESSENTIAL HYPERTENSION: ICD-10-CM

## 2023-11-27 RX ORDER — LISINOPRIL 10 MG/1
10 TABLET ORAL DAILY
Qty: 60 TABLET | Refills: 0 | Status: SHIPPED | OUTPATIENT
Start: 2023-11-27

## 2023-12-01 NOTE — TELEPHONE ENCOUNTER
Spoke with patient and informed of message below.  He states he will have to call us back to schedule.

## 2023-12-14 ENCOUNTER — NURSE TRIAGE (OUTPATIENT)
Dept: FAMILY MEDICINE | Facility: OTHER | Age: 63
End: 2023-12-14
Payer: COMMERCIAL

## 2023-12-14 NOTE — TELEPHONE ENCOUNTER
Nurse Triage SBAR    Is this a 2nd Level Triage? NO    Situation: Patient reports he has had sinus pain and congestion for about 4 weeks. He has been using Sinex for his symptoms for some relief but states it has not cleared. No fevers, just congestion. He reports he gets these 1-2 times per year.     Background: History of sinus infections    Assessment: Patient needs an appointment to discuss sinus symptoms.    Protocol Recommended Disposition:   See in office today or tomorrow    Recommendation: Appointment scheduled with PCP tomorrow          Does the patient meet one of the following criteria for ADS visit consideration? No    NILAM Potter, RN      Additional Information   Negative: Sounds like a life-threatening emergency to the triager   Negative: Difficulty breathing, and not from stuffy nose (e.g., not relieved by cleaning out the nose)   Negative: SEVERE headache and has fever   Negative: Patient sounds very sick or weak to the triager   Negative: SEVERE sinus pain   Negative: Severe headache   Negative: Redness or swelling on the cheek, forehead, or around the eye   Negative: Fever > 103 F (39.4 C)   Negative: Fever > 101 F (38.3 C) and over 60 years of age   Negative: Fever > 100.0 F (37.8 C) and has diabetes mellitus or a weak immune system (e.g., HIV positive, cancer chemotherapy, organ transplant, splenectomy, chronic steroids)   Negative: Fever > 100.0 F (37.8 C) and bedridden (e.g., nursing home patient, stroke, chronic illness, recovering from surgery)   Sinus congestion (pressure, fullness) present > 10 days   Negative: Fever present > 3 days (72 hours)   Negative: Fever returns after gone for over 24 hours and symptoms worse or not improved   Negative: Sinus pain (not just congestion) and fever   Negative: Earache    Protocols used: Sinus Pain and Congestion-A-[place]

## 2023-12-15 ENCOUNTER — VIRTUAL VISIT (OUTPATIENT)
Dept: FAMILY MEDICINE | Facility: OTHER | Age: 63
End: 2023-12-15
Payer: COMMERCIAL

## 2023-12-15 DIAGNOSIS — J01.90 ACUTE SINUSITIS WITH SYMPTOMS > 10 DAYS: Primary | ICD-10-CM

## 2023-12-15 PROCEDURE — 99213 OFFICE O/P EST LOW 20 MIN: CPT | Mod: VID | Performed by: PHYSICIAN ASSISTANT

## 2023-12-15 RX ORDER — FLUTICASONE PROPIONATE 50 MCG
2 SPRAY, SUSPENSION (ML) NASAL DAILY
Qty: 16 G | Refills: 0 | Status: SHIPPED | OUTPATIENT
Start: 2023-12-15

## 2023-12-15 NOTE — PATIENT INSTRUCTIONS
Stop the current nasal spray and use Flonase instead.  Will send over Augmentin.  Stay well hydrated and follow-up if not improving.    Schedule an annual physical.

## 2023-12-15 NOTE — PROGRESS NOTES
Ketan is a 63 year old who is being evaluated via a billable video visit.      How would you like to obtain your AVS? MyChart  If the video visit is dropped, the invitation should be resent by: Text to cell phone: [phone]  Will anyone else be joining your video visit? No      Assessment & Plan       ICD-10-CM    1. Acute sinusitis with symptoms > 10 days  J01.90 amoxicillin-clavulanate (AUGMENTIN) 875-125 MG tablet     fluticasone (FLONASE) 50 MCG/ACT nasal spray          Will prescribe Augmentin to take twice daily for 10 days to treat for sinusitis.  Drink plenty of fluids.  Can use an over the counter Nettipot or sinus rinse to help with nasal congestion. Mucinex can also be helpful.   I also recommend Flonase to help with nasal swelling. I recommend he discontinue the oxymetolazone as this is likely causing rebound congestion.  Follow up if symptoms are not improving.        ALFIE Shannon [facility]   Ketan is a 63 year old, presenting for the following health issues:  Sinus Problem        12/15/2023    10:31 AM   Additional Questions   Roomed by Amy FRANCO     Acute Illness  Acute illness concerns: Sinus concerns  Onset/Duration: one month  Symptoms:  Fever: No  Chills/Sweats: No  Headache (location?): YES - Comes and goes every few days   Sinus Pressure: YES  Conjunctivitis:  No  Ear Pain: no  Rhinorrhea: No  Congestion: YES  Sore Throat: No  Cough: no  Wheeze: No  Decreased Appetite: No  Nausea: No  Vomiting: No  Diarrhea: YES  Dysuria/Freq.: No  Dysuria or Hematuria: No  Fatigue/Achiness: YES  Sick/Strep Exposure: No  Therapies tried and outcome: Nasal Spray - slightly helpful     He has been using oxymetolazone nasal spray for the past month which helps significantly for about 10 hours but then the congestion recurs. He typically has a sinus infection like this once per year and usually responds well to Augmentin.         Review of Systems    Constitutional, HEENT, cardiovascular, pulmonary, gi and gu systems are negative, except as otherwise noted.      Objective           Vitals:  No vitals were obtained today due to virtual visit.    Physical Exam   GENERAL: Healthy, alert and no distress  EYES: Eyes grossly normal to inspection.  No discharge or erythema, or obvious scleral/conjunctival abnormalities.  RESP: No audible wheeze, cough, or visible cyanosis.  No visible retractions or increased work of breathing.    SKIN: Visible skin clear. No significant rash, abnormal pigmentation or lesions.  NEURO: Cranial nerves grossly intact.  Mentation and speech appropriate for age.  PSYCH: Mentation appears normal, affect normal/bright, judgement and insight intact, normal speech and appearance well-groomed.        Video-Visit Details    Type of service:  Video Visit     Originating Location (pt. Location): Home  Distant Location (provider location):  Off-site  Platform used for Video Visit: Open[employer]

## 2024-01-06 ENCOUNTER — HEALTH MAINTENANCE LETTER (OUTPATIENT)
Age: 64
End: 2024-01-06

## 2025-01-25 ENCOUNTER — HEALTH MAINTENANCE LETTER (OUTPATIENT)
Age: 65
End: 2025-01-25